# Patient Record
Sex: FEMALE | Race: WHITE | NOT HISPANIC OR LATINO
[De-identification: names, ages, dates, MRNs, and addresses within clinical notes are randomized per-mention and may not be internally consistent; named-entity substitution may affect disease eponyms.]

---

## 2019-03-19 ENCOUNTER — HOSPITAL (OUTPATIENT)
Dept: OTHER | Age: 29
End: 2019-03-19
Attending: NURSE PRACTITIONER

## 2019-04-06 ENCOUNTER — DIAGNOSTIC TRANS (OUTPATIENT)
Dept: OTHER | Age: 29
End: 2019-04-06

## 2019-04-10 ENCOUNTER — HOSPITAL (OUTPATIENT)
Dept: OTHER | Age: 29
End: 2019-04-10
Attending: INTERNAL MEDICINE

## 2019-04-10 LAB
ENDOMYSIAL ANTIBODY IGG: NORMAL
ENDOMYSIAL IGA ANTIBODY: NORMAL
IGA SERPL-MCNC: 74 MG/DL (ref 82–453)
TTG IGA SER IA-ACNC: 2 UNIT
TTG IGG SER IA-ACNC: 2 UNIT

## 2019-04-11 LAB — IGA SERPL-MCNC: 74 MG/DL (ref 82–453)

## 2019-04-12 LAB
ENDOMYSIAL IGA ANTIBODY (ENDOMA): NORMAL
TTG IGA SER IA-ACNC: 2 UNITS
TTG IGG SER IA-ACNC: 2 UNITS

## 2019-04-16 LAB — ENDOMYSIUM IGG TITR SER IF: NORMAL {TITER}

## 2023-08-20 PROBLEM — D72.828 NEUTROPHILIC LEUKOCYTOSIS: Status: ACTIVE | Noted: 2023-08-20

## 2023-08-20 PROBLEM — R43.8 OTHER DISTURBANCES OF SMELL AND TASTE: Status: ACTIVE | Noted: 2023-08-20

## 2023-08-20 PROBLEM — E55.9 VITAMIN D DEFICIENCY: Status: ACTIVE | Noted: 2023-08-20

## 2023-08-20 PROBLEM — E27.9: Status: ACTIVE | Noted: 2023-08-20

## 2023-08-20 PROBLEM — Z97.5 NEXPLANON IN PLACE: Status: ACTIVE | Noted: 2023-08-20

## 2023-08-20 PROBLEM — J45.20 MILD INTERMITTENT ASTHMA (HHS-HCC): Status: ACTIVE | Noted: 2023-08-20

## 2023-08-20 PROBLEM — N32.81 OAB (OVERACTIVE BLADDER): Status: ACTIVE | Noted: 2023-08-20

## 2023-08-20 PROBLEM — T83.32XA MALPOSITIONED IUD: Status: ACTIVE | Noted: 2023-08-20

## 2023-08-20 PROBLEM — D72.9 NEUTROPHILIC LEUKOCYTOSIS: Status: ACTIVE | Noted: 2023-08-20

## 2023-08-20 PROBLEM — F32.81 PREMENSTRUAL DYSPHORIC DISORDER: Status: ACTIVE | Noted: 2023-08-20

## 2023-08-20 PROBLEM — F51.01 PRIMARY INSOMNIA: Status: ACTIVE | Noted: 2023-08-20

## 2023-08-20 PROBLEM — R19.03 RIGHT LOWER QUADRANT ABDOMINAL MASS: Status: ACTIVE | Noted: 2023-08-20

## 2023-08-20 PROBLEM — R20.2 PARESTHESIA OF FOOT: Status: ACTIVE | Noted: 2023-08-20

## 2023-08-20 PROBLEM — N91.1 SECONDARY AMENORRHEA: Status: ACTIVE | Noted: 2023-08-20

## 2023-08-20 PROBLEM — G89.29 CHRONIC RIGHT-SIDED THORACIC BACK PAIN: Status: ACTIVE | Noted: 2023-08-20

## 2023-08-20 PROBLEM — E25.9: Status: ACTIVE | Noted: 2023-08-20

## 2023-08-20 PROBLEM — R00.2 HEART PALPITATIONS: Status: ACTIVE | Noted: 2023-08-20

## 2023-08-20 PROBLEM — E03.9 ACQUIRED HYPOTHYROIDISM: Status: ACTIVE | Noted: 2023-08-20

## 2023-08-20 PROBLEM — Z96.82 PRESENCE OF NEUROSTIMULATOR: Status: ACTIVE | Noted: 2023-08-20

## 2023-08-20 PROBLEM — M54.6 CHRONIC RIGHT-SIDED THORACIC BACK PAIN: Status: ACTIVE | Noted: 2023-08-20

## 2023-08-20 PROBLEM — G47.30 SLEEP APNEA: Status: ACTIVE | Noted: 2023-08-20

## 2023-08-20 PROBLEM — R32 URINARY INCONTINENCE: Status: ACTIVE | Noted: 2023-08-20

## 2023-08-20 PROBLEM — G47.419 TYPE 2 NARCOLEPSY (HHS-HCC): Status: ACTIVE | Noted: 2023-08-20

## 2023-08-20 PROBLEM — D69.6 THROMBOCYTOPENIA (CMS-HCC): Status: ACTIVE | Noted: 2023-08-20

## 2023-08-20 PROBLEM — D58.2 ELEVATED HEMOGLOBIN (CMS-HCC): Status: ACTIVE | Noted: 2023-08-20

## 2023-08-20 RX ORDER — LEVOTHYROXINE SODIUM 75 UG/1
TABLET ORAL
COMMUNITY
Start: 2021-08-11

## 2023-08-20 RX ORDER — MULTIVITAMIN
TABLET ORAL
COMMUNITY
Start: 2016-07-18

## 2023-08-20 RX ORDER — CHOLECALCIFEROL (VITAMIN D3) 50 MCG
TABLET ORAL
COMMUNITY

## 2023-08-20 RX ORDER — OXYCODONE HYDROCHLORIDE 5 MG/1
TABLET ORAL
COMMUNITY
Start: 2022-08-29 | End: 2024-04-11 | Stop reason: ALTCHOICE

## 2023-08-20 RX ORDER — MIRABEGRON 50 MG/1
TABLET, EXTENDED RELEASE ORAL
COMMUNITY
Start: 2022-09-15 | End: 2023-10-30

## 2023-08-20 RX ORDER — ALBUTEROL SULFATE 90 UG/1
AEROSOL, METERED RESPIRATORY (INHALATION)
COMMUNITY

## 2023-09-11 ENCOUNTER — HOSPITAL ENCOUNTER (OUTPATIENT)
Dept: DATA CONVERSION | Facility: HOSPITAL | Age: 33
Discharge: HOME | End: 2023-09-11
Payer: COMMERCIAL

## 2023-09-11 DIAGNOSIS — Z11.51 ENCOUNTER FOR SCREENING FOR HUMAN PAPILLOMAVIRUS (HPV): ICD-10-CM

## 2023-09-11 DIAGNOSIS — Z01.419 ENCOUNTER FOR GYNECOLOGICAL EXAMINATION (GENERAL) (ROUTINE) WITHOUT ABNORMAL FINDINGS: ICD-10-CM

## 2023-10-11 ENCOUNTER — TREATMENT (OUTPATIENT)
Dept: PHYSICAL THERAPY | Facility: CLINIC | Age: 33
End: 2023-10-11
Payer: COMMERCIAL

## 2023-10-11 DIAGNOSIS — R35.0 URINARY FREQUENCY: Primary | ICD-10-CM

## 2023-10-11 DIAGNOSIS — N39.41 URGE INCONTINENCE OF URINE: ICD-10-CM

## 2023-10-11 DIAGNOSIS — M62.89 PELVIC FLOOR DYSFUNCTION: ICD-10-CM

## 2023-10-11 DIAGNOSIS — K59.00 CONSTIPATION: ICD-10-CM

## 2023-10-11 PROCEDURE — 97140 MANUAL THERAPY 1/> REGIONS: CPT | Mod: GP,59 | Performed by: PHYSICAL THERAPIST

## 2023-10-11 PROCEDURE — 97162 PT EVAL MOD COMPLEX 30 MIN: CPT | Mod: GP | Performed by: PHYSICAL THERAPIST

## 2023-10-11 ASSESSMENT — ENCOUNTER SYMPTOMS
OCCASIONAL FEELINGS OF UNSTEADINESS: 0
DEPRESSION: 0
LOSS OF SENSATION IN FEET: 0

## 2023-10-11 ASSESSMENT — PAIN SCALES - GENERAL: PAINLEVEL_OUTOF10: 0 - NO PAIN

## 2023-10-11 ASSESSMENT — PAIN - FUNCTIONAL ASSESSMENT: PAIN_FUNCTIONAL_ASSESSMENT: 0-10

## 2023-10-11 NOTE — PROGRESS NOTES
Physical Therapy Evaluation and Treatment      Patient Name: Sanjuana Lawson  MRN: [id number]  Today's Date: 10/11/2023  Time Calculation  Start Time: 0910  Stop Time: 1010  Time Calculation (min): 60 min      Assessment:   Sanjuana Lawson presents to PT w/ complaints of urinary frequency, urgency, and incontinence as well as constipation. Gross lumbopelvic strength and ROM assessed today w/ no glaring deficits. Decreased bladder and kidney fascia mobility noted w/ abdominal assessment. Internal assessment revealed decreased release of contraction as well as paradoxical contraction of superficial muscles of pelvic floor. Findings consistent w/ dx of urinary frequency, urge incontinence, constipation, and pelvic floor dysfunction. Pt will benefit from skilled PT to address impairments.    Plan:  Rehab Potential: Good  Plan of Care Agreement: Patient  Plan to see pt 1x a week for 6-8 visits    Treatment could include: therapeutic exercise, self care, manual therapy, neuromuscular reeducation, home exercise program, therapeutic activity, ultrasound, TENS     Current Problem:   1. Urinary frequency        2. Urge incontinence of urine        3. Constipation        4. Pelvic floor dysfunction            Subjective    General:   Bladder issues started in 2014, no known triggering event. Always remembers having bladder issues and has progressively gotten worse. Has done botox, TENS, PFPT, medications for inco/OAB, currently has interstim but doesn't think it helps. Went to PFPT in 2019 1x a week for a few weeks, doesn't feel like it helped at all    Has narcolepsy, doesn't sleep well.    Bladder:  DTF: at least every hour  NTF: 1-2 x  Sometimes pain when urinates when urge is present. Urge comes on quick w/ pain in abdomen and then pain w/ urination  Wears thick pads, goes through 4-5/day    Bowel:  1x every 4-5 days  Has tried miralax but had diarrhea so stopped, thinks tried Linzess    Sexual history:  No pain w/  intercourse    Social:  Sees chiropractor 1x a month  Exercise: lifts weights, tabata, hiit workouts  Lives w/ boyfriend and dog  Works as       Precautions:  Precautions  STEADI Fall Risk Score (The score of 4 or more indicates an increased risk of falling): 0  Interstim device implanted    Pain:  Pain Assessment  Pain Assessment: 0-10  Pain Score: 0 - No pain      Objective   External and internal assessment explained. Verbal consent obtained to proceed.     HIP  Hip abd ROM   L    40*  Hip abd ROM R 45*+  Hip rotation R IR/ER wnl, L ER wnl IR slightly limited  Hamstrings R wnl, L lacking 10*  Hip flexor strength R    5/5  Hip flexor strength L   5/5  Hip  abd strength right   5/5  Hip abd strength left   5/5  Hip add strength right    4+/5  Hip add strength left     4+/5  Glute strength R   5/5  Glute strength L    5/5  External rotation strength R    5/5 in seated  External rotation strength L    5/5 in seated  Internal rotation strength  R    5/5 in seated  Internal rotation strength L     5/5 in seated    KNEE  Flexion right        5/5  Flexion left          5/5  Extension right    5/5  Extension left      5/5  *indicates pain with testing    SLS b/l 10s+  Small squat  technique    Varus/Valgus noted - none    Delayed glute activation  y/n - n    Oblique dominant strategy - y    Abdominal:  Decreased bladder mobility L > R and superior>inferior  Decreased left kidney fascia movement    Pelvic floor contraction:  4+/5 deep pelvic floor strength  Decreased release of contraction  Paradoxical contraction of superficial muscles of pelvic floor - contracted during lengthening, lengthened during contraction  + High tone pelvic floor - no areas of pain and discomfort    Pelvic alignment:  Mild left hypomobility  R on left sacral rotation     Outcome Measures:  Other Measures  Other Outcome Measures: Female NIH-CPSI = 18     Treatments:   Manual:  Pt in L sidelying  Diaphragmatic scooping    Pt in R  sidelying  R on left  sacral rotation corrected with MET    Access Code: [email or web address]  URL: [email or web address]  Date: 10/11/2023  Prepared by: Leda Hagen    Exercises  - Hooklying Transversus Abdominis Palpation  - 1 x daily - 7 x weekly - 2 sets - 10 reps - 3 hold  - Sidelying Hip Adduction  - 1 x daily - 7 x weekly - 2 sets - 10 reps - 3 hold    OP EDUCATION:  Education  Plan of Care Discussed and Agreed Upon: yesPt educated on benefits of miralax in small doses, proper technique for abdominal massage to improve bowel motility, following up on medical management of narcolepsy    Goals:  ST goals (4 visits):   Patient will demonstrate independence and report adherence with home exercise program (HEP) to facilitate independent rehab program upon discharge to maximize functional gains.   Patient will demonstrate good isolation and activation of transverse abdominis to provide better lumbopelvic stability.     LT goals (8 visits):  Regulate voiding schedule to every 2-3 hours to allow for more community, work participation.  NIH CPSI will improve by at least 6 points. Baseline:  18  Patient will be able to isolate pelvic floor and contract/relax on cue to improve lumbopelvic stabilization and continence.  Patient will wake no more than 1x per night to void, allowing for restorative sleep

## 2023-10-16 ENCOUNTER — TREATMENT (OUTPATIENT)
Dept: PHYSICAL THERAPY | Facility: CLINIC | Age: 33
End: 2023-10-16
Payer: COMMERCIAL

## 2023-10-16 DIAGNOSIS — N32.81 OAB (OVERACTIVE BLADDER): Primary | ICD-10-CM

## 2023-10-16 DIAGNOSIS — N39.41 URGE INCONTINENCE OF URINE: ICD-10-CM

## 2023-10-16 DIAGNOSIS — G89.29 CHRONIC RIGHT-SIDED THORACIC BACK PAIN: ICD-10-CM

## 2023-10-16 DIAGNOSIS — M54.6 CHRONIC RIGHT-SIDED THORACIC BACK PAIN: ICD-10-CM

## 2023-10-16 PROCEDURE — 97140 MANUAL THERAPY 1/> REGIONS: CPT | Mod: GP | Performed by: PHYSICAL THERAPIST

## 2023-10-16 NOTE — PROGRESS NOTES
Physical Therapy Treatment    Patient Name: Sanjuana Lawson  MRN: [id number]  Today's Date: 10/16/2023         Assessment:   Patient responded well to interventions.     Plan:   Progress as able.     Current Problem  1. OAB (overactive bladder)        2. Urge incontinence of urine        3. Chronic right-sided thoracic back pain            Subjective    Patient notes feeling a little relief after first session.     Objective   L > R psoas fascial tightness        Treatments:  Manual therapy:  Fascial release of psoas, ureters, bladder  Internal superficial to deep stretching  STP release  Urachus release

## 2023-10-24 ENCOUNTER — TREATMENT (OUTPATIENT)
Dept: PHYSICAL THERAPY | Facility: CLINIC | Age: 33
End: 2023-10-24
Payer: COMMERCIAL

## 2023-10-24 DIAGNOSIS — N39.41 URGE INCONTINENCE OF URINE: Primary | ICD-10-CM

## 2023-10-24 PROCEDURE — 97112 NEUROMUSCULAR REEDUCATION: CPT | Mod: GP | Performed by: PHYSICAL THERAPIST

## 2023-10-24 PROCEDURE — 97140 MANUAL THERAPY 1/> REGIONS: CPT | Mod: GP | Performed by: PHYSICAL THERAPIST

## 2023-10-24 NOTE — PROGRESS NOTES
"Physical Therapy Treatment    Patient Name: Sanjuana Lawson  MRN: [id number]  Today's Date: 10/24/2023         Assessment:   Patient responded well to interventions.     Plan:   Progress as able.     Current Problem  1. Urge incontinence of urine            Subjective    Patient notes feeling a little relief after first session.    Felt good until Saturday and had 2 major leaks  Saturday - alcohol - definitely a trigger.   Poor sleep  Poor hydration     Objective   L > R psoas fascial tightness        Treatments:  Manual therapy:  Fascial release of psoas, ureters, bladder  Internal superficial to deep stretching  STP release  Urachus release    Neuro re-ed -   Real time ultrasound - +paradoxical contraction  Good contraction of Tra  Gave \"blueberry\" exercise     P  "

## 2023-10-30 ENCOUNTER — OFFICE VISIT (OUTPATIENT)
Dept: UROLOGY | Facility: CLINIC | Age: 33
End: 2023-10-30
Payer: COMMERCIAL

## 2023-10-30 VITALS
WEIGHT: 162 LBS | SYSTOLIC BLOOD PRESSURE: 116 MMHG | HEART RATE: 65 BPM | DIASTOLIC BLOOD PRESSURE: 74 MMHG | HEIGHT: 69 IN | BODY MASS INDEX: 23.99 KG/M2

## 2023-10-30 DIAGNOSIS — N32.89 BLADDER SPASM: ICD-10-CM

## 2023-10-30 DIAGNOSIS — K59.00 CONSTIPATION, UNSPECIFIED CONSTIPATION TYPE: Primary | ICD-10-CM

## 2023-10-30 DIAGNOSIS — N39.41 URGE INCONTINENCE OF URINE: ICD-10-CM

## 2023-10-30 DIAGNOSIS — R39.15 URGENCY OF MICTURITION: ICD-10-CM

## 2023-10-30 PROCEDURE — 99213 OFFICE O/P EST LOW 20 MIN: CPT | Performed by: NURSE PRACTITIONER

## 2023-10-30 PROCEDURE — 1036F TOBACCO NON-USER: CPT | Performed by: NURSE PRACTITIONER

## 2023-10-30 NOTE — PROGRESS NOTES
"10/30/23   [id number]    Chief Complaint   Patient presents with    Follow-up      Subjective      HPI Sanjuana Lawson is a 32 y.o. female who presents for follow up OAB.  Doesn't feel the InterStim is doing any thing, she has tried multiple programs.  She is better on the days she sees Leda for PFPT; Also dehydrated; did not see GI, MiraLAX was too much, plans to try smaller dose; works w Leda relaxing pelvic floor; Has tried botox bladder and SNM.  The urges in past few mos come on so strong, leaking when she stands up; plans to order Uqora for sleep and bladder; no UTIs; failed gabapentin in past as well; not interested in amitriptyline at this time; saw Dr. Woods in past, maybe could consider Botox pelvic floor?  Failed Gemtesa, Myrbetriq, Oxybutynin, Solfenacin;           Objective     /74   Pulse 65   Ht 1.753 m (5' 9\")   Wt 73.5 kg (162 lb)   BMI 23.92 kg/m²    Physical Exam  General: Appears comfortable and in no apparent distress, well nourished  Head: Normocephalic, atraumatic  Neck: trachea midline  Respiratory: respirations unlabored, no wheezes, and no use of accessory muscles  Cardiovascular: at rest no dyspnea, well perfused  Skin: no visible rashes or lesions  Neurologic: grossly intact, oriented to person, place, and time  Psychiatric: mood and affect appropriate  Musculoskeletal: in chair for appt. no difficulty w upper body movement      Assessment/Plan   Problem List Items Addressed This Visit          Genitourinary and Reproductive    Urinary incontinence     Other Visit Diagnoses       Constipation, unspecified constipation type    -  Primary    Relevant Orders    Referral to Gastroenterology    Urgency of micturition        Bladder spasm              Orders Placed This Encounter   Procedures    Referral to Gastroenterology     Standing Status:   Future     Standing Expiration Date:   4/30/2024     Referral Priority:   Routine     Referral Type:   Consultation     Referral Reason:   " Specialty Services Required     Requested Specialty:   Gastroenterology     Number of Visits Requested:   1         Plan:   Discussed speaking w Urologist further recommendations, maybe botox pelvic floor  Has failed multiple therapy Myrbetriq, Oxybutynin, Solifenacin, SNM, PTNS, botox bladder  Does better days she sees PFPT  Referral GI constipation-cycle w dehydrating for bladder, but also may contributes  Discussed gabapentin failed in past, discussed down regulating w amitriptyline or prozac, reluctant, may consider  Nurse line [phone]    Pretty Perez, APRN-CNP  Lab Results   Component Value Date    GLUCOSE 98 08/09/2022    CALCIUM 9.2 08/09/2022     08/09/2022    K 4.3 08/09/2022    CO2 23 (L) 08/09/2022     08/09/2022    BUN 15 08/09/2022    CREATININE 1.0 08/09/2022

## 2023-10-30 NOTE — PATIENT INSTRUCTIONS
Plan:   Discussed speaking w Urologist further recommendations, maybe botox pelvic floor  Has failed multiple therapy Myrbetriq, Oxybutynin, Solifenacin, SNM, PTNS, botox bladder  Does better days she sees PFPT  Referral GI constipation-cycle w dehydrating for bladder, but also may contributes  Discussed gabapentin failed in past, discussed down regulating w amitriptyline or prozac, reluctant, may consider  Nurse line [phone]

## 2023-11-09 ENCOUNTER — TREATMENT (OUTPATIENT)
Dept: PHYSICAL THERAPY | Facility: CLINIC | Age: 33
End: 2023-11-09
Payer: COMMERCIAL

## 2023-11-09 DIAGNOSIS — R35.0 URINARY FREQUENCY: ICD-10-CM

## 2023-11-09 DIAGNOSIS — M62.89 PELVIC FLOOR DYSFUNCTION: ICD-10-CM

## 2023-11-09 DIAGNOSIS — N39.41 URGE INCONTINENCE OF URINE: ICD-10-CM

## 2023-11-09 DIAGNOSIS — K59.00 CONSTIPATION: ICD-10-CM

## 2023-11-09 PROCEDURE — 97140 MANUAL THERAPY 1/> REGIONS: CPT | Mod: GP | Performed by: PHYSICAL THERAPIST

## 2023-11-09 NOTE — PROGRESS NOTES
Physical Therapy Treatment    Patient Name: Sanjuana Lawson  MRN: [id number]  Today's Date: 11/9/2023         Assessment:   No adverse symptoms     Plan:   Progress as able.     Current Problem  1. Urinary frequency  Follow Up In Physical Therapy      2. Urge incontinence of urine  Follow Up In Physical Therapy      3. Constipation  Follow Up In Physical Therapy      4. Pelvic floor dysfunction  Follow Up In Physical Therapy          Subjective   Patient saw Pretty Perez who recommended consult with Dr. Woods for pelvic floor trigger point injections.      Objective   +TrP left bulbocavernosus  Treatments:  Patient was educated on risks and benefits associated with dry needling, what to expect, and post-procedure protocol (i.e. increased water intake, increased stretching, etc.).  Patient educated on signs and symptoms associated with pneumothorax and to go to ED if patient were to start experiencing these after being needled in thorax area.  Patient was also educated on modifications to HEP.  Verbal consent was received to proceed with treatment.  Patient supine  Superficial dry needling to  .22x25 along medial urachal line  .22x25 left and right bulbocavernosus  .3x50 mm to left levator ani  Stretching by PT after TPDN

## 2023-11-20 ENCOUNTER — TREATMENT (OUTPATIENT)
Dept: PHYSICAL THERAPY | Facility: CLINIC | Age: 33
End: 2023-11-20
Payer: COMMERCIAL

## 2023-11-20 DIAGNOSIS — N39.41 URGE INCONTINENCE OF URINE: ICD-10-CM

## 2023-11-20 DIAGNOSIS — R35.0 URINARY FREQUENCY: ICD-10-CM

## 2023-11-20 DIAGNOSIS — M62.89 PELVIC FLOOR DYSFUNCTION: ICD-10-CM

## 2023-11-20 DIAGNOSIS — K59.00 CONSTIPATION: ICD-10-CM

## 2023-11-20 PROCEDURE — 97140 MANUAL THERAPY 1/> REGIONS: CPT | Mod: GP | Performed by: PHYSICAL THERAPIST

## 2023-11-20 NOTE — PROGRESS NOTES
Physical Therapy Treatment    Patient Name: Sanjuana Lawson  MRN: [id number]  Today's Date: 11/20/2023         Assessment:   No adverse symptoms     Plan:   Progress as able.  Encouraged patient to make appointment with Dr. Woods    Current Problem  1. Urinary frequency  Follow Up In Physical Therapy      2. Urge incontinence of urine  Follow Up In Physical Therapy      3. Constipation  Follow Up In Physical Therapy      4. Pelvic floor dysfunction  Follow Up In Physical Therapy          Subjective   Felt like yesterday was bad. Leaked a lot while grocery shopping.     Objective   +TrP left bulbocavernosus  Treatments:  Patient was educated on risks and benefits associated with dry needling, what to expect, and post-procedure protocol (i.e. increased water intake, increased stretching, etc.).  Patient educated on signs and symptoms associated with pneumothorax and to go to ED if patient were to start experiencing these after being needled in thorax area.  Patient was also educated on modifications to HEP.  Verbal consent was received to proceed with treatment.  Patient supine  Superficial dry needling to  .22x25 along medial urachal line  .22x25 left and right bulbocavernosus  .3x60 mm bilateral adductor (rx2)  Abdominal massage to promote peristalsis  Ileocecal valve mob  Stretching by PT after TPDN

## 2023-11-21 ENCOUNTER — TELEPHONE (OUTPATIENT)
Dept: PRIMARY CARE | Facility: CLINIC | Age: 33
End: 2023-11-21
Payer: COMMERCIAL

## 2023-11-21 NOTE — TELEPHONE ENCOUNTER
Sanjuana called asking if you could fill out her PE form for work. She was seen by KP back in Feb for her PE/pap. Was done on 2/13/23. Form was emailed and printed along with her ofc summary. Was placed in fax box. Will email back once done.

## 2023-11-28 ENCOUNTER — TREATMENT (OUTPATIENT)
Dept: PHYSICAL THERAPY | Facility: CLINIC | Age: 33
End: 2023-11-28
Payer: COMMERCIAL

## 2023-11-28 DIAGNOSIS — R35.0 URINARY FREQUENCY: ICD-10-CM

## 2023-11-28 DIAGNOSIS — M62.89 PELVIC FLOOR DYSFUNCTION: ICD-10-CM

## 2023-11-28 DIAGNOSIS — N39.41 URGE INCONTINENCE OF URINE: ICD-10-CM

## 2023-11-28 DIAGNOSIS — K59.00 CONSTIPATION: ICD-10-CM

## 2023-11-28 PROCEDURE — 97140 MANUAL THERAPY 1/> REGIONS: CPT | Mod: GP | Performed by: PHYSICAL THERAPIST

## 2023-11-28 NOTE — PROGRESS NOTES
Physical Therapy Treatment    Patient Name: Sanjuana Lawson  MRN: [id number]  Today's Date: 11/28/2023  Time Calculation  Start Time: 1106  Stop Time: 1200  Time Calculation (min): 54 min      Assessment:   No adverse symptoms     Plan:   Progress as able.  Encouraged patient to make appointment with Dr. Woods    Current Problem  1. Urinary frequency  Follow Up In Physical Therapy      2. Urge incontinence of urine  Follow Up In Physical Therapy      3. Constipation  Follow Up In Physical Therapy      4. Pelvic floor dysfunction  Follow Up In Physical Therapy          Subjective   Patient notes she has been feeling better.      Objective   No gait antalgia   Treatments:  Patient was educated on risks and benefits associated with dry needling, what to expect, and post-procedure protocol (i.e. increased water intake, increased stretching, etc.).  Patient educated on signs and symptoms associated with pneumothorax and to go to ED if patient were to start experiencing these after being needled in thorax area.  Patient was also educated on modifications to HEP.  Verbal consent was received to proceed with treatment.  Patient supine  Superficial dry needling to  .22x25 along medial and median urachal line  .22x25 left and right bulbocavernosus  .3x60 mm bilateral adductor (rx2)  Patient in left sidelying - .3x60 mm to right piriformis and right glute med   No TrPs in left glute    Stretching by PT after TPDN

## 2023-12-04 NOTE — TELEPHONE ENCOUNTER
Pt says she was seen by our office on 2/13/23 for PE /pap with Estelle Blackmon. Pt is asking for a work form to be filled out confirming she was seen., in order for her to receive a reimbursement for her health insurance. Please advise call P: [phone].

## 2023-12-04 NOTE — TELEPHONE ENCOUNTER
Nichole called back is upset that the office that she was seen at is not able to state that she was seen here. She is asking to speak with the , Luz. She is not understanding why she has to schedule an appt to have her form filled out.

## 2023-12-05 NOTE — TELEPHONE ENCOUNTER
Patient was aware and had requested for it to be emailed to her. Had emailed the forms to the patient

## 2023-12-12 ENCOUNTER — TREATMENT (OUTPATIENT)
Dept: PHYSICAL THERAPY | Facility: CLINIC | Age: 33
End: 2023-12-12
Payer: COMMERCIAL

## 2023-12-12 DIAGNOSIS — N39.41 URGE INCONTINENCE OF URINE: ICD-10-CM

## 2023-12-12 DIAGNOSIS — R35.0 URINARY FREQUENCY: ICD-10-CM

## 2023-12-12 DIAGNOSIS — K59.00 CONSTIPATION: ICD-10-CM

## 2023-12-12 DIAGNOSIS — M62.89 PELVIC FLOOR DYSFUNCTION: ICD-10-CM

## 2023-12-12 PROCEDURE — 97140 MANUAL THERAPY 1/> REGIONS: CPT | Mod: GP | Performed by: PHYSICAL THERAPIST

## 2023-12-12 NOTE — PROGRESS NOTES
Physical Therapy Treatment    Patient Name: Sanjuana Lawson  MRN: [id number]  Today's Date: 12/12/2023         Assessment:   No adverse symptoms     Plan:   Progress as able.      Current Problem  1. Urinary frequency  Follow Up In Physical Therapy      2. Urge incontinence of urine  Follow Up In Physical Therapy      3. Constipation  Follow Up In Physical Therapy      4. Pelvic floor dysfunction  Follow Up In Physical Therapy          Subjective   Patient was very sick last week (likely flu). Patient worked a lot of overtime after.  Patient notes being able to suppress urges better.      Objective   No gait antalgia   L > R internal pelvic floor tightness  Treatments:  Patient was educated on risks and benefits associated with dry needling, what to expect, and post-procedure protocol (i.e. increased water intake, increased stretching, etc.).  Patient educated on signs and symptoms associated with pneumothorax and to go to ED if patient were to start experiencing these after being needled in thorax area.  Patient was also educated on modifications to HEP.  Verbal consent was received to proceed with treatment.  Patient supine      .3x60 mm bilateral adductor (rx2)     Internal stretching by PT     Stretching by PT after TPDN

## 2023-12-18 ENCOUNTER — TREATMENT (OUTPATIENT)
Dept: PHYSICAL THERAPY | Facility: CLINIC | Age: 33
End: 2023-12-18
Payer: COMMERCIAL

## 2023-12-18 DIAGNOSIS — M62.89 PELVIC FLOOR DYSFUNCTION: ICD-10-CM

## 2023-12-18 DIAGNOSIS — K59.00 CONSTIPATION: ICD-10-CM

## 2023-12-18 DIAGNOSIS — R35.0 URINARY FREQUENCY: ICD-10-CM

## 2023-12-18 DIAGNOSIS — N39.41 URGE INCONTINENCE OF URINE: ICD-10-CM

## 2023-12-18 PROCEDURE — 97140 MANUAL THERAPY 1/> REGIONS: CPT | Mod: GP | Performed by: PHYSICAL THERAPIST

## 2023-12-18 NOTE — PROGRESS NOTES
Physical Therapy Treatment    Patient Name: Sanjuana Lawson  MRN: [id number]  Today's Date: 12/18/2023  Time Calculation  Start Time: 1506  Stop Time: 1600  Time Calculation (min): 54 min      Assessment:   No adverse symptoms     Plan:   Progress as able.      Current Problem  1. Urinary frequency  Follow Up In Physical Therapy      2. Urge incontinence of urine  Follow Up In Physical Therapy      3. Constipation  Follow Up In Physical Therapy      4. Pelvic floor dysfunction  Follow Up In Physical Therapy          Subjective   A couple of bad episodes today.     Objective   No gait antalgia   L > R internal pelvic floor tightness  Treatments:  Patient was educated on risks and benefits associated with dry needling, what to expect, and post-procedure protocol (i.e. increased water intake, increased stretching, etc.).  Patient educated on signs and symptoms associated with pneumothorax and to go to ED if patient were to start experiencing these after being needled in thorax area.  Patient was also educated on modifications to HEP.  Verbal consent was received to proceed with treatment.  Patient supine      .3x60 mm bilateral adductor (rx2)     Internal stretching by PT     Stretching by PT after TPDN

## 2023-12-19 ENCOUNTER — APPOINTMENT (OUTPATIENT)
Dept: PHYSICAL THERAPY | Facility: CLINIC | Age: 33
End: 2023-12-19
Payer: COMMERCIAL

## 2024-01-22 ENCOUNTER — APPOINTMENT (OUTPATIENT)
Dept: UROLOGY | Facility: CLINIC | Age: 34
End: 2024-01-22
Payer: COMMERCIAL

## 2024-01-23 ENCOUNTER — OFFICE VISIT (OUTPATIENT)
Dept: UROLOGY | Facility: CLINIC | Age: 34
End: 2024-01-23
Payer: COMMERCIAL

## 2024-01-23 ENCOUNTER — TELEPHONE (OUTPATIENT)
Dept: UROLOGY | Facility: CLINIC | Age: 34
End: 2024-01-23

## 2024-01-23 VITALS — DIASTOLIC BLOOD PRESSURE: 79 MMHG | SYSTOLIC BLOOD PRESSURE: 124 MMHG | HEART RATE: 52 BPM

## 2024-01-23 DIAGNOSIS — N32.81 OAB (OVERACTIVE BLADDER): Primary | ICD-10-CM

## 2024-01-23 DIAGNOSIS — R35.0 URINARY FREQUENCY: ICD-10-CM

## 2024-01-23 DIAGNOSIS — Z86.39 HX OF ADRENAL INSUFFICIENCY: ICD-10-CM

## 2024-01-23 DIAGNOSIS — M62.89 PELVIC FLOOR DYSFUNCTION: ICD-10-CM

## 2024-01-23 LAB
POC BILIRUBIN, URINE: NEGATIVE
POC BLOOD, URINE: NEGATIVE
POC GLUCOSE, URINE: NEGATIVE MG/DL
POC KETONES, URINE: NEGATIVE MG/DL
POC LEUKOCYTES, URINE: NEGATIVE
POC NITRITE,URINE: NEGATIVE
POC PH, URINE: 6 PH
POC PROTEIN, URINE: NEGATIVE MG/DL
POC SPECIFIC GRAVITY, URINE: 1.02
POC UROBILINOGEN, URINE: 0.2 EU/DL

## 2024-01-23 PROCEDURE — 51798 US URINE CAPACITY MEASURE: CPT | Performed by: STUDENT IN AN ORGANIZED HEALTH CARE EDUCATION/TRAINING PROGRAM

## 2024-01-23 PROCEDURE — 81003 URINALYSIS AUTO W/O SCOPE: CPT | Performed by: STUDENT IN AN ORGANIZED HEALTH CARE EDUCATION/TRAINING PROGRAM

## 2024-01-23 PROCEDURE — 99205 OFFICE O/P NEW HI 60 MIN: CPT | Performed by: STUDENT IN AN ORGANIZED HEALTH CARE EDUCATION/TRAINING PROGRAM

## 2024-01-23 PROCEDURE — 1036F TOBACCO NON-USER: CPT | Performed by: STUDENT IN AN ORGANIZED HEALTH CARE EDUCATION/TRAINING PROGRAM

## 2024-01-23 RX ORDER — TAMSULOSIN HYDROCHLORIDE 0.4 MG/1
0.4 CAPSULE ORAL DAILY
Qty: 90 CAPSULE | Refills: 3 | Status: SHIPPED | OUTPATIENT
Start: 2024-01-23 | End: 2025-01-17

## 2024-01-23 RX ORDER — DIAZEPAM 5 MG/1
TABLET ORAL
Qty: 10 TABLET | Refills: 0 | Status: SHIPPED | OUTPATIENT
Start: 2024-01-23

## 2024-01-23 RX ORDER — TAMSULOSIN HYDROCHLORIDE 0.4 MG/1
0.4 CAPSULE ORAL DAILY
Qty: 30 CAPSULE | Refills: 0 | Status: SHIPPED | OUTPATIENT
Start: 2024-01-23 | End: 2024-03-01

## 2024-01-23 NOTE — TELEPHONE ENCOUNTER
Pt stated medication was sent to the wrong pharmacy. [facility] . Thank you

## 2024-01-23 NOTE — LETTER
January 23, 2024     SUE Wood  [address] [place] [address]    Patient: Sanjuana Lawson   YOB: 1990   Date of Visit: 1/23/2024       Dear SUE Cabrera:    Thank you for referring Sanjuana Lawson to me for evaluation. Below are my notes for this consultation.  If you have questions, please do not hesitate to call me. I look forward to following your patient along with you.       Sincerely,     Hernandez Woods MD      CC: SUE Durham  ______________________________________________________________________________________        PCP  SUE Wood         CHIEF COMPLAINT:           HISTORY OF PRESENT ILLNESS:  This is a  33 y.o. y.o. who presents with severe urinary urgency incontinence.  Patient has a longstanding history of this.  She  has tried all 7 medications, she had a Botox injection 100 units, she is also undergoing InterStim with 2 revisions.  The InterStim worked for about 2 months and then she  she reverted back to her old symptoms.  She does complain of some stress urinary incontinence with exercise.  Does not have significant pain related to voiding.  She has no pain with intercourse.  She does not complain of a vaginal bulge or pressure.  She is interested in having children in the future.  She has also tried Flomax.  She is seeing Leda Hagen.  She does report that when she sees her symptoms improve a little bit.  She has a history of congenital adrenal hyperplasia and potentially adrenal insufficiency.  She is not currently on medication for this.         Past Medical History  She has a past medical history of Other conditions influencing health status, Personal history of other drug therapy, Personal history of other drug therapy (09/20/2018), and Personal history of other infectious and parasitic diseases.    Surgical History  She has no past surgical history on file.     Social History  She reports that she has never smoked. She has never  "used smokeless tobacco. She reports current alcohol use of about 1.0 standard drink of alcohol per week. She reports that she does not use drugs.    Family History  Family History   Problem Relation Name Age of Onset   • No Known Problems Mother     • No Known Problems Father     • Lung cancer Father's Brother          unspecified laterally, unspecified part of lung   • Breast cancer Maternal Grandmother          unspecified site of breast, unspecified laterally   • Other (cva) Maternal Grandfather          due to embolism of precebral artery   • Breast cancer Paternal Grandfather          unspecified laterally, unspecified site of breast   • Cancer Other multiple family members    • Hypertension Other multiple family members         Allergies  Patient has no known allergies.        A comprehensive 10+ review of systems was negative except for: see hpi                    PHYSICAL EXAMINATION:  BP Readings from Last 3 Encounters:   01/23/24 124/79   10/30/23 116/74   03/21/23 122/56      Wt Readings from Last 3 Encounters:   10/30/23 73.5 kg (162 lb)   03/21/23 74.9 kg (165 lb 2 oz)   02/28/23 74.4 kg (164 lb 0.4 oz)      BMI: Estimated body mass index is 23.92 kg/m² as calculated from the following:    Height as of 10/30/23: 1.753 m (5' 9\").    Weight as of 10/30/23: 73.5 kg (162 lb).  BSA: Estimated body surface area is 1.89 meters squared as calculated from the following:    Height as of 10/30/23: 1.753 m (5' 9\").    Weight as of 10/30/23: 73.5 kg (162 lb).  HEENT: Normocephalic, atraumatic, PER EOMI, nonicteric, trachea normal, thyroid normal, oropharynx normal.  CARDIAC: regular rate & rhythm, S1 & S2 normal.  No heaves, thrills, gallops or murmurs.  LUNGS: Clear to auscultation, no spinal or CV tenderness.  EXTREMITIES: No evidence of cyanosis, clubbing or edema.    Pelvic:  Genitourinary:  normal external genitalia, Bartholin's glands negative, [place]'s glands negative  Urethra   normal meatus, non-tender, no " periurethral mass  Vaginal mucosa  normal  Cervix  normal  Uterus normal size, nontender  Adnexae  negative nontender, no masses  Atrophy negative      CST negative  Pelvic floor muscle contraction  4/5    POP-Q (in supine position):        Aa -3     Ba -3     C -8              gh 3     pb 3     tvl 8              Ap -3     Bp -3     D -8    Rectal: no hemorrhoids, fissures or masses    PVR (by Ultrasound):  257  Urine dip: No results found for this or any previous visit.         IMPRESSION AND PLAN:  Sanjuana Lawson is a 33 y.o. who presents with treatment refractory urinary urge incontinence    UUI:  Has tried virtually every therapy including neuromodulation with sacral implant and PTNS, currently has the InterStim in place, Botox, all 7 medications,    She does complain of some stress incontinence and this may indicate that she may have either pelvic floor issue or mixed incontinence    I need to track down her urodynamic testing    She does notice improvement with Leda when she has pelvic floor therapy    We are going to try vaginal Valium to see if this improves her symptoms if it does we can consider pelvic floor Botox, I will tentatively schedule her for this in June    We are also going to try Flomax at the volume does not help    If I can track down the urodynamics and going to repeat them    We discussed potentially performing a bladder sling versus  urethral bulking    She will follow-up with me for cystoscopy    All questions and concerns were answered and addressed.  The patient expressed understanding and agrees with the plan.     1/23/2024                                   Cystoscopy   Endocrine referall  Flomax  Botox pelvis in June   Then consider sling vs bulking  Wants to have kids

## 2024-01-23 NOTE — PROGRESS NOTES
PCP  Estelle Blackmon, APRN-CNP         CHIEF COMPLAINT:           HISTORY OF PRESENT ILLNESS:  This is a  33 y.o. y.o. who presents with severe urinary urgency incontinence.  Patient has a longstanding history of this.  She  has tried all 7 medications, she had a Botox injection 100 units, she is also undergoing InterStim with 2 revisions.  The InterStim worked for about 2 months and then she  she reverted back to her old symptoms.  She does complain of some stress urinary incontinence with exercise.  Does not have significant pain related to voiding.  She has no pain with intercourse.  She does not complain of a vaginal bulge or pressure.  She is interested in having children in the future.  She has also tried Flomax.  She is seeing Leda Hagen.  She does report that when she sees her symptoms improve a little bit.  She has a history of congenital adrenal hyperplasia and potentially adrenal insufficiency.  She is not currently on medication for this.         Past Medical History  She has a past medical history of Other conditions influencing health status, Personal history of other drug therapy, Personal history of other drug therapy (09/20/2018), and Personal history of other infectious and parasitic diseases.    Surgical History  She has no past surgical history on file.     Social History  She reports that she has never smoked. She has never used smokeless tobacco. She reports current alcohol use of about 1.0 standard drink of alcohol per week. She reports that she does not use drugs.    Family History  Family History   Problem Relation Name Age of Onset    No Known Problems Mother      No Known Problems Father      Lung cancer Father's Brother          unspecified laterally, unspecified part of lung    Breast cancer Maternal Grandmother          unspecified site of breast, unspecified laterally    Other (cva) Maternal Grandfather          due to embolism of precebral artery    Breast cancer Paternal  "Grandfather          unspecified laterally, unspecified site of breast    Cancer Other multiple family members     Hypertension Other multiple family members         Allergies  Patient has no known allergies.        A comprehensive 10+ review of systems was negative except for: see hpi                    PHYSICAL EXAMINATION:  BP Readings from Last 3 Encounters:   01/23/24 124/79   10/30/23 116/74   03/21/23 122/56      Wt Readings from Last 3 Encounters:   10/30/23 73.5 kg (162 lb)   03/21/23 74.9 kg (165 lb 2 oz)   02/28/23 74.4 kg (164 lb 0.4 oz)      BMI: Estimated body mass index is 23.92 kg/m² as calculated from the following:    Height as of 10/30/23: 1.753 m (5' 9\").    Weight as of 10/30/23: 73.5 kg (162 lb).  BSA: Estimated body surface area is 1.89 meters squared as calculated from the following:    Height as of 10/30/23: 1.753 m (5' 9\").    Weight as of 10/30/23: 73.5 kg (162 lb).  HEENT: Normocephalic, atraumatic, PER EOMI, nonicteric, trachea normal, thyroid normal, oropharynx normal.  CARDIAC: regular rate & rhythm, S1 & S2 normal.  No heaves, thrills, gallops or murmurs.  LUNGS: Clear to auscultation, no spinal or CV tenderness.  EXTREMITIES: No evidence of cyanosis, clubbing or edema.    Pelvic:  Genitourinary:  normal external genitalia, Bartholin's glands negative, [employer]'s glands negative  Urethra   normal meatus, non-tender, no periurethral mass  Vaginal mucosa  normal  Cervix  normal  Uterus normal size, nontender  Adnexae  negative nontender, no masses  Atrophy negative      CST negative  Pelvic floor muscle contraction  4/5    POP-Q (in supine position):        Aa -3     Ba -3     C -8              gh 3     pb 3     tvl 8              Ap -3     Bp -3     D -8    Rectal: no hemorrhoids, fissures or masses    PVR (by Ultrasound):  257  Urine dip: No results found for this or any previous visit.         IMPRESSION AND PLAN:  Snajuana Lawson is a 33 y.o. who presents with treatment refractory urinary " urge incontinence    UUI:  Has tried virtually every therapy including neuromodulation with sacral implant and PTNS, currently has the InterStim in place, Botox, all 7 medications,    She does complain of some stress incontinence and this may indicate that she may have either pelvic floor issue or mixed incontinence    I need to track down her urodynamic testing    She does notice improvement with Leda when she has pelvic floor therapy    We are going to try vaginal Valium to see if this improves her symptoms if it does we can consider pelvic floor Botox, I will tentatively schedule her for this in June    We are also going to try Flomax at the volume does not help    If I can track down the urodynamics and going to repeat them    We discussed potentially performing a bladder sling versus  urethral bulking    She will follow-up with me for cystoscopy    All questions and concerns were answered and addressed.  The patient expressed understanding and agrees with the plan.     Adrenal Insufficiency:   Refer to endocrinology     1/23/2024

## 2024-01-24 RX ORDER — ACETAMINOPHEN 325 MG/1
975 TABLET ORAL ONCE
Status: CANCELLED | OUTPATIENT
Start: 2024-01-24 | End: 2024-01-24

## 2024-01-24 RX ORDER — GABAPENTIN 300 MG/1
600 CAPSULE ORAL ONCE
Status: CANCELLED | OUTPATIENT
Start: 2024-01-24 | End: 2024-01-24

## 2024-01-24 RX ORDER — CELECOXIB 50 MG/1
400 CAPSULE ORAL ONCE
Status: CANCELLED | OUTPATIENT
Start: 2024-01-24 | End: 2024-01-24

## 2024-01-30 ENCOUNTER — TREATMENT (OUTPATIENT)
Dept: PHYSICAL THERAPY | Facility: CLINIC | Age: 34
End: 2024-01-30
Payer: COMMERCIAL

## 2024-01-30 DIAGNOSIS — N39.41 URGE INCONTINENCE OF URINE: ICD-10-CM

## 2024-01-30 DIAGNOSIS — K59.00 CONSTIPATION: ICD-10-CM

## 2024-01-30 DIAGNOSIS — R35.0 URINARY FREQUENCY: ICD-10-CM

## 2024-01-30 DIAGNOSIS — M62.89 PELVIC FLOOR DYSFUNCTION: ICD-10-CM

## 2024-01-30 PROCEDURE — 97140 MANUAL THERAPY 1/> REGIONS: CPT | Mod: GP | Performed by: PHYSICAL THERAPIST

## 2024-01-30 NOTE — PROGRESS NOTES
Physical Therapy Treatment    Patient Name: Sanjuana Lawson  MRN: [id number]  Today's Date: 1/30/2024         Assessment:   No adverse symptoms     Plan:   Progress as able.      Current Problem  1. Urinary frequency  Follow Up In Physical Therapy      2. Urge incontinence of urine  Follow Up In Physical Therapy      3. Constipation  Follow Up In Physical Therapy      4. Pelvic floor dysfunction  Follow Up In Physical Therapy            Subjective   A couple of bad episodes - at home.  Patient saw Dr. Woods    Objective   No gait antalgia   L > R internal pelvic floor tightness  Treatments:  Patient was educated on risks and benefits associated with dry needling, what to expect, and post-procedure protocol (i.e. increased water intake, increased stretching, etc.).  Patient educated on signs and symptoms associated with pneumothorax and to go to ED if patient were to start experiencing these after being needled in thorax area.  Patient was also educated on modifications to HEP.  Verbal consent was received to proceed with treatment.  Patient supine      .3x60 mm bilateral adductor + LTR  .3x30 mm to right bulbo, center of superficial perineum (+ LTR)  .3x60 mm to left levator ani     Internal stretching by PT     Stretching by PT after TPDN

## 2024-02-14 ENCOUNTER — TREATMENT (OUTPATIENT)
Dept: PHYSICAL THERAPY | Facility: CLINIC | Age: 34
End: 2024-02-14
Payer: COMMERCIAL

## 2024-02-14 DIAGNOSIS — N39.41 URGE INCONTINENCE OF URINE: ICD-10-CM

## 2024-02-14 DIAGNOSIS — K59.00 CONSTIPATION: ICD-10-CM

## 2024-02-14 DIAGNOSIS — R35.0 URINARY FREQUENCY: ICD-10-CM

## 2024-02-14 DIAGNOSIS — M62.89 PELVIC FLOOR DYSFUNCTION: ICD-10-CM

## 2024-02-14 PROCEDURE — 97140 MANUAL THERAPY 1/> REGIONS: CPT | Mod: GP | Performed by: PHYSICAL THERAPIST

## 2024-02-14 NOTE — PROGRESS NOTES
"Physical Therapy Treatment    Patient Name: Sanjuana Lawson  MRN: [id number]  Today's Date: 2/14/2024         Assessment:  Decreased tightness.     Plan:   Progress as able.   Patient follows up with Dr. Hernandez Woods tomorrow.     Current Problem  1. Urinary frequency  Follow Up In Physical Therapy      2. Urge incontinence of urine  Follow Up In Physical Therapy      3. Constipation  Follow Up In Physical Therapy      4. Pelvic floor dysfunction  Follow Up In Physical Therapy              Subjective   Really bad few weeks.   Vaginal valium caused constipation   Flomax has been \"awful\" per patient   Getting a cystoscopy tomorrow     Objective   No gait antalgia   L > R internal pelvic floor tightness  Treatments:  Patient was educated on risks and benefits associated with dry needling, what to expect, and post-procedure protocol (i.e. increased water intake, increased stretching, etc.).  Patient educated on signs and symptoms associated with pneumothorax and to go to ED if patient were to start experiencing these after being needled in thorax area.  Patient was also educated on modifications to HEP.  Verbal consent was received to proceed with treatment.  Patient supine      .3x60 mm bilateral adductor + LTR  x2  .3x30 mm to right bulbo, center of superficial perineum (+ LTR)  .3x60 mm to left levator ani   .22x20 x3 over suprapubic line (superficial try needling)   Internal stretching by PT     Stretching by PT after TPDN       "

## 2024-02-15 ENCOUNTER — PROCEDURE VISIT (OUTPATIENT)
Dept: UROLOGY | Facility: CLINIC | Age: 34
End: 2024-02-15
Payer: COMMERCIAL

## 2024-02-15 DIAGNOSIS — N32.81 OAB (OVERACTIVE BLADDER): Primary | ICD-10-CM

## 2024-02-15 DIAGNOSIS — R10.2 PELVIC PAIN: ICD-10-CM

## 2024-02-15 DIAGNOSIS — R35.0 URINARY FREQUENCY: ICD-10-CM

## 2024-02-15 DIAGNOSIS — M62.89 PELVIC FLOOR DYSFUNCTION: ICD-10-CM

## 2024-02-15 LAB
POC APPEARANCE, URINE: CLEAR
POC BILIRUBIN, URINE: NEGATIVE
POC BLOOD, URINE: ABNORMAL
POC COLOR, URINE: YELLOW
POC GLUCOSE, URINE: NEGATIVE MG/DL
POC KETONES, URINE: NEGATIVE MG/DL
POC LEUKOCYTES, URINE: NEGATIVE
POC NITRITE,URINE: NEGATIVE
POC PH, URINE: 7 PH
POC PROTEIN, URINE: NEGATIVE MG/DL
POC SPECIFIC GRAVITY, URINE: 1.02
POC UROBILINOGEN, URINE: 0.2 EU/DL

## 2024-02-15 PROCEDURE — 99214 OFFICE O/P EST MOD 30 MIN: CPT | Performed by: STUDENT IN AN ORGANIZED HEALTH CARE EDUCATION/TRAINING PROGRAM

## 2024-02-15 PROCEDURE — 52000 CYSTOURETHROSCOPY: CPT | Performed by: STUDENT IN AN ORGANIZED HEALTH CARE EDUCATION/TRAINING PROGRAM

## 2024-02-15 NOTE — PROGRESS NOTES
CHIEF COMPLAINT: Cystoscopy         HISTORY OF PRESENT ILLNESS:  Sanjuana Lawson is a 34 y/o female presenting on 2/15/24 for a cystoscopy. Patient is currently taking Valium and Flomax which makes her experience urge incontinence and frequency. She doesn't feel like she's emptying completely. UDS reviewed, demonstrates DO, normal emptying.  She is seeing Leda Hagen for pelvic floor PT and Leda suspects that she may improve with pelvic floor PT         Past Medical History  She has a past medical history of Other conditions influencing health status, Personal history of other drug therapy, Personal history of other drug therapy (09/20/2018), and Personal history of other infectious and parasitic diseases.    Surgical History  She has no past surgical history on file.     Social History  She reports that she has never smoked. She has never used smokeless tobacco. She reports current alcohol use of about 1.0 standard drink of alcohol per week. She reports that she does not use drugs.    Family History  Family History   Problem Relation Name Age of Onset    No Known Problems Mother      No Known Problems Father      Lung cancer Father's Brother          unspecified laterally, unspecified part of lung    Breast cancer Maternal Grandmother          unspecified site of breast, unspecified laterally    Other (cva) Maternal Grandfather          due to embolism of precebral artery    Breast cancer Paternal Grandfather          unspecified laterally, unspecified site of breast    Cancer Other multiple family members     Hypertension Other multiple family members         Allergies  Patient has no known allergies.      A comprehensive 10+ review of systems was negative except for: see hpi                          PHYSICAL EXAMINATION:  BP Readings from Last 3 Encounters:   01/23/24 124/79   10/30/23 116/74   03/21/23 122/56      Wt Readings from Last 3 Encounters:   10/30/23 73.5 kg (162 lb)   03/21/23 74.9 kg (165 lb 2 oz)  "  02/28/23 74.4 kg (164 lb 0.4 oz)      BMI:   Estimated body mass index is 23.92 kg/m² as calculated from the following:    Height as of 10/30/23: 1.753 m (5' 9\").    Weight as of 10/30/23: 73.5 kg (162 lb).  BSA:   Estimated body surface area is 1.89 meters squared as calculated from the following:    Height as of 10/30/23: 1.753 m (5' 9\").    Weight as of 10/30/23: 73.5 kg (162 lb).  HEENT: Normocephalic, atraumatic, PER EOMI, nonicteric, trachea normal, thyroid normal, oropharynx normal.  CARDIAC: regular rate & rhythm, S1 & S2 normal.  No heaves, thrills, gallops or murmurs.  LUNGS: Clear to auscultation, no spinal or CV tenderness.  EXTREMITIES: No evidence of cyanosis, clubbing or edema.    Procedure Note:  Cystoscopy     Indication  LUTS    POST-OP DIAGNOSIS:   Same.  ANESTHESIA:    2% Lidocaine gel.  PROCEDURE:    Cystoscopy.  SURGEON:     esther    FINDINGS    Bladder:  normal  Trigone & Ureteral Orifices: normal.  Vesical Neck:  normal.  Urethra: normal.    PROCEDURE PHYSICIAN NOTE  I was present in the exam room  for the entire procedure as above. We reviewed procedure with patient along with the indications, options, alternatives,  risks of having and not having procedure,  benefits, and probability of success.  We answered the patients questions. We explained the expected post procedure symptoms including possible dysuria and infection.  Pt consented to have procedure.  The patient will follow up for discussion of her results.          Provider Impressions:  Sanjuana Lawson is a 33 y.o. who presents with treatment refractory urinary urge incontinence     UUI:  Has tried virtually every therapy including neuromodulation with sacral implant and PTNS, currently has the InterStim in place, Botox, all 7 medications,    Cystoscopy normal    Recent urodynamics demonstrates detrusor overactivity    She does complain of some stress incontinence and this may indicate that she may have dysfunctional " voiding    Continue pelvic floor therapy with Leda    Currently taking vaginal Valium to see if this improves her symptoms if it does we can consider pelvic floor Botox, I will tentatively schedule her for this in June    Flomax worsened symptoms, discussed bladder sling but rather start with botox we will start with pelvic floor Botox    We discussed potentially performing a bladder sling versus  urethral bulking            Adrenal Insufficiency:   No new complaints  Schedule with endocrinology      Hernandez Woods MD    By signing my name below, I, Ansley Chatterjee   attest that this documentation has been prepared under the direction and in the presence of Dr. Hernandez Woods.

## 2024-02-16 RX ORDER — ACETAMINOPHEN 325 MG/1
975 TABLET ORAL ONCE
OUTPATIENT
Start: 2024-02-16 | End: 2024-02-16

## 2024-02-16 RX ORDER — CELECOXIB 400 MG/1
400 CAPSULE ORAL ONCE
OUTPATIENT
Start: 2024-02-16 | End: 2024-02-16

## 2024-02-16 RX ORDER — GABAPENTIN 600 MG/1
600 TABLET ORAL ONCE
OUTPATIENT
Start: 2024-02-16 | End: 2024-02-16

## 2024-03-01 RX ORDER — TAMSULOSIN HYDROCHLORIDE 0.4 MG/1
0.4 CAPSULE ORAL DAILY
Qty: 90 CAPSULE | Refills: 1 | Status: SHIPPED | OUTPATIENT
Start: 2024-03-01 | End: 2024-08-28

## 2024-03-08 ENCOUNTER — TREATMENT (OUTPATIENT)
Dept: PHYSICAL THERAPY | Facility: CLINIC | Age: 34
End: 2024-03-08
Payer: COMMERCIAL

## 2024-03-08 DIAGNOSIS — M62.89 PELVIC FLOOR DYSFUNCTION: ICD-10-CM

## 2024-03-08 DIAGNOSIS — K59.00 CONSTIPATION: ICD-10-CM

## 2024-03-08 DIAGNOSIS — N39.41 URGE INCONTINENCE OF URINE: ICD-10-CM

## 2024-03-08 DIAGNOSIS — R35.0 URINARY FREQUENCY: ICD-10-CM

## 2024-03-08 PROCEDURE — 97140 MANUAL THERAPY 1/> REGIONS: CPT | Mod: GP | Performed by: PHYSICAL THERAPIST

## 2024-03-08 PROCEDURE — 97112 NEUROMUSCULAR REEDUCATION: CPT | Mod: GP | Performed by: PHYSICAL THERAPIST

## 2024-03-08 NOTE — PROGRESS NOTES
Physical Therapy Treatment    Patient Name: Sanjuana Lawson  MRN: [id number]  Today's Date: 3/8/2024         Assessment:  Decreased tightness.     Plan:  Patient will bring device to turn off interstim for treatment.     Current Problem  1. Urinary frequency  Follow Up In Physical Therapy      2. Urge incontinence of urine  Follow Up In Physical Therapy      3. Constipation  Follow Up In Physical Therapy      4. Pelvic floor dysfunction  Follow Up In Physical Therapy              Subjective   Really bad few weeks.   Cystoscopy negative    Objective   No gait antalgia   L > R internal pelvic floor tightness  Treatments:  Neuromuscular re-education using internal sensor placed in vagina, patient supine, knees supported.  Resting tone =  31.8       microvolts (uv) after 60s  After a few minutes - lowest 24.5  interstim on     Patient was educated on risks and benefits associated with dry needling, what to expect, and post-procedure protocol (i.e. increased water intake, increased stretching, etc.).  Patient educated on signs and symptoms associated with pneumothorax and to go to ED if patient were to start experiencing these after being needled in thorax area.  Patient was also educated on modifications to HEP.  Verbal consent was received to proceed with treatment.  Patient supine      .3x60 mm bilateral adductor + LTR  x2  ni   .22x20 x3 over suprapubic line (superficial try needling)   Internal stretching by PT     Stretching by PT after TPDN

## 2024-03-20 ENCOUNTER — TREATMENT (OUTPATIENT)
Dept: PHYSICAL THERAPY | Facility: CLINIC | Age: 34
End: 2024-03-20
Payer: COMMERCIAL

## 2024-03-20 DIAGNOSIS — R35.0 URINARY FREQUENCY: ICD-10-CM

## 2024-03-20 DIAGNOSIS — K59.00 CONSTIPATION: ICD-10-CM

## 2024-03-20 DIAGNOSIS — N39.41 URGE INCONTINENCE OF URINE: ICD-10-CM

## 2024-03-20 DIAGNOSIS — M62.89 PELVIC FLOOR DYSFUNCTION: ICD-10-CM

## 2024-03-20 PROCEDURE — 97112 NEUROMUSCULAR REEDUCATION: CPT | Mod: GP | Performed by: PHYSICAL THERAPIST

## 2024-03-20 PROCEDURE — 97140 MANUAL THERAPY 1/> REGIONS: CPT | Mod: GP | Performed by: PHYSICAL THERAPIST

## 2024-03-20 NOTE — PROGRESS NOTES
Physical Therapy Treatment    Patient Name: Sanjuana Lawson  MRN: [id number]  Today's Date: 3/20/2024         Assessment:  Significantly improved tone    Plan:  Progress as able    Current Problem  1. Urinary frequency  Follow Up In Physical Therapy      2. Urge incontinence of urine  Follow Up In Physical Therapy      3. Constipation  Follow Up In Physical Therapy      4. Pelvic floor dysfunction  Follow Up In Physical Therapy              Subjective   Not as bad couple of weeks    Objective   No gait antalgia   L > R internal pelvic floor tightness  Treatments:  Neuromuscular re-education using internal sensor placed in vagina, patient supine, knees supported.  Resting tone =  11.4       microvolts (uv) after 60s  12.5hz 5on/5off to inhibit muscle tone x 15 minutes  x20 uv/ma  Tone after = 6.0    Internal trp stretching and release superficial, mid, deep pelvic floor

## 2024-03-22 ENCOUNTER — APPOINTMENT (OUTPATIENT)
Dept: PHYSICAL THERAPY | Facility: CLINIC | Age: 34
End: 2024-03-22
Payer: COMMERCIAL

## 2024-03-27 ENCOUNTER — TREATMENT (OUTPATIENT)
Dept: PHYSICAL THERAPY | Facility: CLINIC | Age: 34
End: 2024-03-27
Payer: COMMERCIAL

## 2024-03-27 DIAGNOSIS — K59.00 CONSTIPATION: ICD-10-CM

## 2024-03-27 DIAGNOSIS — N39.41 URGE INCONTINENCE OF URINE: ICD-10-CM

## 2024-03-27 DIAGNOSIS — R35.0 URINARY FREQUENCY: ICD-10-CM

## 2024-03-27 DIAGNOSIS — M62.89 PELVIC FLOOR DYSFUNCTION: ICD-10-CM

## 2024-03-27 PROCEDURE — 97112 NEUROMUSCULAR REEDUCATION: CPT | Mod: GP | Performed by: PHYSICAL THERAPIST

## 2024-03-27 PROCEDURE — 97140 MANUAL THERAPY 1/> REGIONS: CPT | Mod: GP | Performed by: PHYSICAL THERAPIST

## 2024-03-27 NOTE — PROGRESS NOTES
Physical Therapy Treatment    Patient Name: Sanjuana Lawson  MRN: [id number]  Today's Date: 3/27/2024         Assessment:  Significantly improved tone. Feeling more relaxed in pelvic floor.     Plan:  Progress as able    Current Problem  1. Urinary frequency  Follow Up In Physical Therapy      2. Urge incontinence of urine  Follow Up In Physical Therapy      3. Constipation  Follow Up In Physical Therapy      4. Pelvic floor dysfunction  Follow Up In Physical Therapy              Subjective   Patient notes some good days    Objective   See below  Treatments:  Neuromuscular re-education using internal sensor placed in vagina, patient supine, knees supported.  Resting tone =  9.2       microvolts (uv) after 60s  [id number]      W    16.5            R        7.8        Rise   uv    12.5hz 5on/5off to inhibit muscle tone x 15 minutes  x9 uv/ma  Tone after = 5.0  Peristaltic abdominal massage  Glides to adductors   Internal trp stretching and release superficial, mid, deep pelvic floor

## 2024-04-05 ENCOUNTER — TREATMENT (OUTPATIENT)
Dept: PHYSICAL THERAPY | Facility: CLINIC | Age: 34
End: 2024-04-05
Payer: COMMERCIAL

## 2024-04-05 DIAGNOSIS — N39.41 URGE INCONTINENCE OF URINE: ICD-10-CM

## 2024-04-05 DIAGNOSIS — R35.0 URINARY FREQUENCY: Primary | ICD-10-CM

## 2024-04-05 DIAGNOSIS — M62.89 PELVIC FLOOR DYSFUNCTION: ICD-10-CM

## 2024-04-05 PROCEDURE — 97140 MANUAL THERAPY 1/> REGIONS: CPT | Mod: GP | Performed by: PHYSICAL THERAPIST

## 2024-04-05 NOTE — PROGRESS NOTES
Physical Therapy Treatment    Patient Name: Sanjuana Lawson  MRN: [id number]  Today's Date: 4/5/2024         Assessment:  Decreased pain in right hip flexor. Improved equal gait noted    Plan:  Progress as able    Current Problem  1. Urinary frequency        2. Urge incontinence of urine        3. Pelvic floor dysfunction                  Subjective   Patient notes she injured right hip flexor during leg press at gym. Feels like her symptoms have improved slightly without being on interstim    Patient on menses    Objective   Decreased weightbearing right leg    Treatments:  Patient was educated on risks and benefits associated with dry needling, what to expect, and post-procedure protocol (i.e. increased water intake, increased stretching, etc.).  Patient educated on signs and symptoms associated with pneumothorax and to go to ED if patient were to start experiencing these after being needled in thorax area.  Patient was also educated on modifications to HEP.  Verbal consent was received to proceed with treatment.  Patient supine  .3x75 mm to right iliopsoas conjoint tendong  .3x60 mm to right rectus femoris and right adductor  Stretching by PT  Glides to adductors   Internal trp stretching and release superficial, mid, deep pelvic floor

## 2024-04-11 ENCOUNTER — TREATMENT (OUTPATIENT)
Dept: PHYSICAL THERAPY | Facility: CLINIC | Age: 34
End: 2024-04-11
Payer: COMMERCIAL

## 2024-04-11 ENCOUNTER — OFFICE VISIT (OUTPATIENT)
Dept: GASTROENTEROLOGY | Facility: CLINIC | Age: 34
End: 2024-04-11
Payer: COMMERCIAL

## 2024-04-11 VITALS
BODY MASS INDEX: 24.44 KG/M2 | SYSTOLIC BLOOD PRESSURE: 124 MMHG | WEIGHT: 165 LBS | HEIGHT: 69 IN | HEART RATE: 55 BPM | DIASTOLIC BLOOD PRESSURE: 75 MMHG

## 2024-04-11 DIAGNOSIS — M62.89 PELVIC FLOOR DYSFUNCTION: ICD-10-CM

## 2024-04-11 DIAGNOSIS — K59.04 CHRONIC IDIOPATHIC CONSTIPATION: Primary | ICD-10-CM

## 2024-04-11 DIAGNOSIS — N39.41 URGE INCONTINENCE OF URINE: ICD-10-CM

## 2024-04-11 DIAGNOSIS — R35.0 URINARY FREQUENCY: ICD-10-CM

## 2024-04-11 DIAGNOSIS — K59.00 CONSTIPATION, UNSPECIFIED CONSTIPATION TYPE: ICD-10-CM

## 2024-04-11 PROCEDURE — 99203 OFFICE O/P NEW LOW 30 MIN: CPT | Performed by: INTERNAL MEDICINE

## 2024-04-11 PROCEDURE — 97112 NEUROMUSCULAR REEDUCATION: CPT | Mod: GP | Performed by: PHYSICAL THERAPIST

## 2024-04-11 PROCEDURE — 97140 MANUAL THERAPY 1/> REGIONS: CPT | Mod: GP | Performed by: PHYSICAL THERAPIST

## 2024-04-11 RX ORDER — LUBIPROSTONE 24 UG/1
24 CAPSULE ORAL
Qty: 60 CAPSULE | Refills: 3 | Status: SHIPPED | OUTPATIENT
Start: 2024-04-11 | End: 2025-04-11

## 2024-04-11 ASSESSMENT — ENCOUNTER SYMPTOMS
DIARRHEA: 0
VOMITING: 0
ABDOMINAL DISTENTION: 1
NAUSEA: 0
BLOOD IN STOOL: 0
ANAL BLEEDING: 0
CONSTIPATION: 1
RECTAL PAIN: 0
ABDOMINAL PAIN: 1

## 2024-04-11 NOTE — PATIENT INSTRUCTIONS
Dietary modification as discussed.  Prune juice/Metamucil gummy  Amitiza 24 mcg twice daily.  Follow-up with me in 4 to 6 months

## 2024-04-11 NOTE — PROGRESS NOTES
"Subjective   Patient ID: Sanjuana Lawson is a 33 y.o. female who presents for Constipation.    /75 (BP Location: Left arm, Patient Position: Sitting)   Pulse 55   Ht 1.746 m (5' 8.75\")   Wt 74.8 kg (165 lb)   BMI 24.54 kg/m²     Previous EGD No  Previous Colonoscopy No  Family History of Stomach Cancer No  Family History of Colon Cancer No      Constipation  Associated symptoms include abdominal pain. Pertinent negatives include no diarrhea, nausea, rectal pain or vomiting.     33-year-old female, working as a , history of hypothyroidism, significant overactive bladder came to GI clinic for further evaluation due to chronic constipation.  She endorses constipation for years-bowel movement every 5 to 6 days, [place] type I-II, tried fiber supplement, lactulose, milk of magnesia, MiraLAX without success.  Interestingly bowel habits normalizes  in perimenstrual period.  She cannot drink plenty of water due to significant overactive bladder.  Denies nausea vomiting abdominal pain diarrhea weight loss melena/hematochezia.  Recent outside labs-CBC, CMP unremarkable.  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  Discussed in detail, all question answered.  Review of Systems   Gastrointestinal:  Positive for abdominal distention, abdominal pain and constipation. Negative for anal bleeding, blood in stool, diarrhea, nausea, rectal pain and vomiting.     12 Point ROS negative outside of symptoms stated above in HPI    Past Medical History:   Diagnosis Date    Other conditions influencing health status     Uncomplicated asthma, unspecified asthma severity    Personal history of other drug therapy     History of vaccination against human papillomavirus    Personal history of other drug therapy 09/20/2018    S/P Botox injection    Personal history of other infectious and parasitic diseases     History of chickenpox       No past surgical " history on file.    No relevant family history has been documented for this patient.     reports that she has never smoked. She has never used smokeless tobacco. She reports current alcohol use of about 1.0 standard drink of alcohol per week. She reports that she does not use drugs.    No Known Allergies         Objective         Current Outpatient Medications:     albuterol 90 mcg/actuation inhaler, 2 puffs Inhaler every 4 hours as needed, Disp: , Rfl:     cholecalciferol (Vitamin D-3) 50 MCG (2000 UT) tablet, Vitamin D TABS  Refills: 0     Active, Disp: , Rfl:     docosahexaenoic acid/epa (FISH OIL ORAL), orally once a day, Disp: , Rfl:     glucos sul 2KCl/msm/chond/C/Mn (GLUCOSAMINE CHONDROITIN ORAL), with MSM, Disp: , Rfl:     GLUTATHIONE ORAL, as directed, Disp: , Rfl:     levothyroxine (Synthroid) 75 mcg tablet, take 1 tablet daily in the morning on an empty stomach paola 1 Orally Once a day for 90 days, Disp: , Rfl:     multivit,calc,mins/iron/folic (WOMEN'S ONE DAILY ORAL), as directed Orally, Disp: , Rfl:     vitamin B complex (COMPLEX B-100 ORAL), as directed Orally, Disp: , Rfl:     diazePAM (Valium) 5 mg tablet, Place 1 pill in vagina q8hrs prn for pelvic pain (Patient not taking: Reported on 4/11/2024), Disp: 10 tablet, Rfl: 0    multivitamin tablet, Daily Multiple Vitamins Oral Tablet  Refills: 0      Start : 18-Jul-2016  Active, Disp: , Rfl:     tamsulosin (Flomax) 0.4 mg 24 hr capsule, Take 1 capsule (0.4 mg) by mouth once daily. (Patient not taking: Reported on 4/11/2024), Disp: 90 capsule, Rfl: 3    tamsulosin (Flomax) 0.4 mg 24 hr capsule, TAKE 1 CAPSULE (0.4 MG) BY MOUTH ONCE DAILY FOR 30 DOSES. (Patient not taking: Reported on 4/11/2024), Disp: 90 capsule, Rfl: 1      Physical Exam  HENT:      Mouth/Throat:      Mouth: Mucous membranes are moist.   Eyes:      General: No scleral icterus.  Cardiovascular:      Rate and Rhythm: Regular rhythm. Bradycardia present.      Comments: mild  Pulmonary:       Effort: Pulmonary effort is normal.      Breath sounds: Normal breath sounds.   Abdominal:      General: Abdomen is flat. Bowel sounds are normal.      Palpations: Abdomen is soft.   Musculoskeletal:      Right lower leg: No edema.      Left lower leg: No edema.   Neurological:      Mental Status: She is alert and oriented to person, place, and time.            Assessment/Plan    Chronic idiopathic constipation.  History of methane positive SIBO  Overactive bladder      Dietary modification as discussed.  Prune juice/Metamucil gummy  Amitiza 24 mcg twice daily.  Follow-up with me in 4 to 6 months

## 2024-04-11 NOTE — LETTER
"April 11, 2024     Pretty Perez, APRN-CNP  [address] [facility] [address], [address]  [facility] [address]    Patient: Sanjuana Lawson   YOB: 1990   Date of Visit: 4/11/2024       Dear Dr. Pretty Perez, APRN-CNP:    Thank you for referring Sanjuana Lawson to me for evaluation. Below are my notes for this consultation.  If you have questions, please do not hesitate to call me. I look forward to following your patient along with you.       Sincerely,     Hao Bergman MD      CC: No Recipients  ______________________________________________________________________________________    Subjective  Patient ID: Sanjuana Lawson is a 33 y.o. female who presents for Constipation.    /75 (BP Location: Left arm, Patient Position: Sitting)   Pulse 55   Ht 1.746 m (5' 8.75\")   Wt 74.8 kg (165 lb)   BMI 24.54 kg/m²     Previous EGD No  Previous Colonoscopy No  Family History of Stomach Cancer No  Family History of Colon Cancer No      Constipation  Associated symptoms include abdominal pain. Pertinent negatives include no diarrhea, nausea, rectal pain or vomiting.     33-year-old female, working as a , history of hypothyroidism, significant overactive bladder came to GI clinic for further evaluation due to chronic constipation.  She endorses constipation for years-bowel movement every 5 to 6 days, Travis type I-II, tried fiber supplement, lactulose, milk of magnesia, MiraLAX without success.  Interestingly bowel habits normalizes  in perimenstrual period.  She cannot drink plenty of water due to significant overactive bladder.  Denies nausea vomiting abdominal pain diarrhea weight loss melena/hematochezia.  Recent outside labs-CBC, CMP unremarkable.  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  Discussed in detail, all question answered.  Review of Systems   Gastrointestinal:  Positive for abdominal distention, abdominal pain and " constipation. Negative for anal bleeding, blood in stool, diarrhea, nausea, rectal pain and vomiting.     12 Point ROS negative outside of symptoms stated above in HPI    Past Medical History:   Diagnosis Date   • Other conditions influencing health status     Uncomplicated asthma, unspecified asthma severity   • Personal history of other drug therapy     History of vaccination against human papillomavirus   • Personal history of other drug therapy 09/20/2018    S/P Botox injection   • Personal history of other infectious and parasitic diseases     History of chickenpox       No past surgical history on file.    No relevant family history has been documented for this patient.     reports that she has never smoked. She has never used smokeless tobacco. She reports current alcohol use of about 1.0 standard drink of alcohol per week. She reports that she does not use drugs.    No Known Allergies         Objective        Current Outpatient Medications:   •  albuterol 90 mcg/actuation inhaler, 2 puffs Inhaler every 4 hours as needed, Disp: , Rfl:   •  cholecalciferol (Vitamin D-3) 50 MCG (2000 UT) tablet, Vitamin D TABS  Refills: 0     Active, Disp: , Rfl:   •  docosahexaenoic acid/epa (FISH OIL ORAL), orally once a day, Disp: , Rfl:   •  glucos sul 2KCl/msm/chond/C/Mn (GLUCOSAMINE CHONDROITIN ORAL), with MSM, Disp: , Rfl:   •  GLUTATHIONE ORAL, as directed, Disp: , Rfl:   •  levothyroxine (Synthroid) 75 mcg tablet, take 1 tablet daily in the morning on an empty stomach paola 1 Orally Once a day for 90 days, Disp: , Rfl:   •  multivit,calc,mins/iron/folic (WOMEN'S ONE DAILY ORAL), as directed Orally, Disp: , Rfl:   •  vitamin B complex (COMPLEX B-100 ORAL), as directed Orally, Disp: , Rfl:   •  diazePAM (Valium) 5 mg tablet, Place 1 pill in vagina q8hrs prn for pelvic pain (Patient not taking: Reported on 4/11/2024), Disp: 10 tablet, Rfl: 0  •  multivitamin tablet, Daily Multiple Vitamins Oral Tablet  Refills: 0      Start  : 18-Jul-2016  Active, Disp: , Rfl:   •  tamsulosin (Flomax) 0.4 mg 24 hr capsule, Take 1 capsule (0.4 mg) by mouth once daily. (Patient not taking: Reported on 4/11/2024), Disp: 90 capsule, Rfl: 3  •  tamsulosin (Flomax) 0.4 mg 24 hr capsule, TAKE 1 CAPSULE (0.4 MG) BY MOUTH ONCE DAILY FOR 30 DOSES. (Patient not taking: Reported on 4/11/2024), Disp: 90 capsule, Rfl: 1      Physical Exam  HENT:      Mouth/Throat:      Mouth: Mucous membranes are moist.   Eyes:      General: No scleral icterus.  Cardiovascular:      Rate and Rhythm: Regular rhythm. Bradycardia present.      Comments: mild  Pulmonary:      Effort: Pulmonary effort is normal.      Breath sounds: Normal breath sounds.   Abdominal:      General: Abdomen is flat. Bowel sounds are normal.      Palpations: Abdomen is soft.   Musculoskeletal:      Right lower leg: No edema.      Left lower leg: No edema.   Neurological:      Mental Status: She is alert and oriented to person, place, and time.            Assessment/Plan   Chronic idiopathic constipation.  History of methane positive SIBO  Overactive bladder      Dietary modification as discussed.  Prune juice/Metamucil gummy  Amitiza 24 mcg twice daily.  Follow-up with me in 4 to 6 months

## 2024-04-11 NOTE — PROGRESS NOTES
Physical Therapy Treatment    Patient Name: Sanjuana Lawson  MRN: [id number]  Today's Date: 4/11/2024         Assessment:  Decreased pain in right hip flexor. Improved equal gait noted    Plan:  Progress as able    Current Problem  1. Chronic idiopathic constipation        2. Pelvic floor dysfunction        3. Urge incontinence of urine        4. Urinary frequency              Subjective   Maybe slightly better    Objective   No gait antalgia    Treatments:  Neuromuscular re-education using internal sensor placed in vagina, patient supine, knees supported.  Resting tone =   4.0     microvolts (uv) after 60s  12.5hz 5on/5off to inhibit muscle tone x 15 minutes  x 14 uv/ma  After 4.4        Patient was educated on risks and benefits associated with dry needling, what to expect, and post-procedure protocol (i.e. increased water intake, increased stretching, etc.).  Patient educated on signs and symptoms associated with pneumothorax and to go to ED if patient were to start experiencing these after being needled in thorax area.  Patient was also educated on modifications to HEP.  Verbal consent was received to proceed with treatment.  Patient supine  .3x60 mm to right rectus femoris and right adductor  Stretching by PT  Glides to adductors   Internal trp stretching and release superficial, mid, deep pelvic floor

## 2024-04-15 ENCOUNTER — TREATMENT (OUTPATIENT)
Dept: PHYSICAL THERAPY | Facility: CLINIC | Age: 34
End: 2024-04-15
Payer: COMMERCIAL

## 2024-04-15 DIAGNOSIS — K59.04 CHRONIC IDIOPATHIC CONSTIPATION: ICD-10-CM

## 2024-04-15 DIAGNOSIS — N39.41 URGE INCONTINENCE OF URINE: Primary | ICD-10-CM

## 2024-04-15 DIAGNOSIS — R35.0 URINARY FREQUENCY: ICD-10-CM

## 2024-04-15 DIAGNOSIS — M62.89 PELVIC FLOOR DYSFUNCTION: ICD-10-CM

## 2024-04-15 PROCEDURE — 97112 NEUROMUSCULAR REEDUCATION: CPT | Mod: GP | Performed by: PHYSICAL THERAPIST

## 2024-04-15 PROCEDURE — 97140 MANUAL THERAPY 1/> REGIONS: CPT | Mod: GP | Performed by: PHYSICAL THERAPIST

## 2024-04-15 NOTE — PROGRESS NOTES
Physical Therapy Treatment    Patient Name: Sanjuana Lawson  MRN: [id number]  Today's Date: 4/15/2024         Assessment:  Asked patient to pay attention to position she is in when urge occurs.     Plan:  Progress as able    Current Problem  1. Urge incontinence of urine        2. Urinary frequency        3. Chronic idiopathic constipation        4. Pelvic floor dysfunction                Subjective   Maybe slightly better    Objective   See below  Increased tone at superficial layer    Treatments:  Neuromuscular re-education using internal sensor placed in vagina, patient supine, knees supported.  Resting tone =   7.2   microvolts (uv) after 60s  12.5hz 5on/5off to inhibit muscle tone x 15 minutes  x 16 uv/ma  After 3.2    Glides to adductors   Internal trp stretching and release superficial, mid, deep pelvic floor

## 2024-04-25 ENCOUNTER — APPOINTMENT (OUTPATIENT)
Dept: PHYSICAL THERAPY | Facility: CLINIC | Age: 34
End: 2024-04-25
Payer: COMMERCIAL

## 2024-04-29 ENCOUNTER — OFFICE VISIT (OUTPATIENT)
Dept: ENDOCRINOLOGY | Facility: CLINIC | Age: 34
End: 2024-04-29
Payer: COMMERCIAL

## 2024-04-29 VITALS
DIASTOLIC BLOOD PRESSURE: 62 MMHG | BODY MASS INDEX: 24.44 KG/M2 | TEMPERATURE: 98.6 F | HEIGHT: 69 IN | WEIGHT: 165 LBS | RESPIRATION RATE: 18 BRPM | HEART RATE: 78 BPM | SYSTOLIC BLOOD PRESSURE: 109 MMHG

## 2024-04-29 DIAGNOSIS — R53.83 OTHER FATIGUE: Primary | ICD-10-CM

## 2024-04-29 PROCEDURE — 1036F TOBACCO NON-USER: CPT | Performed by: STUDENT IN AN ORGANIZED HEALTH CARE EDUCATION/TRAINING PROGRAM

## 2024-04-29 PROCEDURE — 99203 OFFICE O/P NEW LOW 30 MIN: CPT | Performed by: STUDENT IN AN ORGANIZED HEALTH CARE EDUCATION/TRAINING PROGRAM

## 2024-04-29 NOTE — PROGRESS NOTES
"Endocrinology  4/29/2024    History of Present Illness   Sanjuana Lawson is a 33 y.o. year old female with medical history of OAB and urinary incontinence s/p multiple interventions, hypothyroidism, here for adrenal fatigue.    Sees a private practice functional medicine provider who diagnosed her with adrenal fatigue.     She is on many different supplements through this practice including Adren-All, Licorice extract, ProOmega Lemon, Liquid Vit D3, active B-complex, Triple Magnesium Complex, Ortho Biotic, Digest Gold, Prenatal Essentials.     Reports that since being on these for many years, blood work has improved.     LV: Dr. Fraser, 2021 for \"adrenal fatigue\".  At that time, patient as on Thyraxis and Adrenal-All supplements.     Diagnosed with hypothyroidism 2018 and started on levothyroxine 75 mcg daily.  Since then TSH has been normal.     Today she would like to know if there is anything I can help her with to feel like she is not \"running on fumes\" all the time. Works nights as a  - will be on nights 30 days and then days 30 days. No more than 3 nights in a row.     She recently was off all supplements for many months. Had blood work done.   Does not feel differently on or off supplements.     Review of Systems   General: Denies fever or chills   Head: Denies headache or vision changes   Neck: Denies tenderness or lumps   Cardiac: Denies chest pain   Respiratory: Denies shortness of breath or cough   GI: Denies abdominal pain, nausea, or vomiting   Extremities: Denies swelling   Skin: Denies rash     Medications     Current Outpatient Medications   Medication Instructions    albuterol 90 mcg/actuation inhaler 2 puffs Inhaler every 4 hours as needed    cholecalciferol (Vitamin D-3) 50 MCG (2000 UT) tablet Vitamin D TABS   Refills: 0       Active    diazePAM (Valium) 5 mg tablet Place 1 pill in vagina q8hrs prn for pelvic pain    docosahexaenoic acid/epa (FISH OIL ORAL) orally once a day    glucos " sul 2KCl/msm/chond/C/Mn (GLUCOSAMINE CHONDROITIN ORAL) with MSM    GLUTATHIONE ORAL as directed    levothyroxine (Synthroid) 75 mcg tablet take 1 tablet daily in the morning on an empty stomach paola 1 Orally Once a day for 90 days    lubiprostone (AMITIZA) 24 mcg, oral, 2 times daily with meals, Take with meals    multivit,calc,mins/iron/folic (WOMEN'S ONE DAILY ORAL) as directed Orally    multivitamin tablet Daily Multiple Vitamins Oral Tablet   Refills: 0        Start : 18-Jul-2016   Active    tamsulosin (FLOMAX) 0.4 mg, oral, Daily    tamsulosin (FLOMAX) 0.4 mg, oral, Daily    vitamin B complex (COMPLEX B-100 ORAL) as directed Orally          History     Past Medical History:   Diagnosis Date    Other conditions influencing health status     Uncomplicated asthma, unspecified asthma severity    Personal history of other drug therapy     History of vaccination against human papillomavirus    Personal history of other drug therapy 09/20/2018    S/P Botox injection    Personal history of other infectious and parasitic diseases     History of chickenpox       No past surgical history on file.    Family History   Problem Relation Name Age of Onset    No Known Problems Mother      No Known Problems Father      Lung cancer Father's Brother          unspecified laterally, unspecified part of lung    Breast cancer Maternal Grandmother          unspecified site of breast, unspecified laterally    Other (cva) Maternal Grandfather          due to embolism of precebral artery    Breast cancer Paternal Grandfather          unspecified laterally, unspecified site of breast    Cancer Other multiple family members     Hypertension Other multiple family members         No Known Allergies     Social history:   - 1 drink weekly   - No tobacco use   - No recreational drug use   - Works as a  - works shift work, and nights 1/2 the time. [place].    - Lives with boyfriend and Dog.       Physical Exam   /62 (BP  "Location: Left arm, Patient Position: Sitting, BP Cuff Size: Adult)   Pulse 78   Temp 37 °C (98.6 °F) (Tympanic)   Resp 18   Ht 1.753 m (5' 9\")   Wt 74.8 kg (165 lb)   BMI 24.37 kg/m²   General: Well appearing, no acute distress  Heart: Normal rate, regular rhythm   Neck: Soft, nontender, no lymphadenopathy, thyroid normal in size   Lungs: Breathing comfortably on room air, CTAB  Extremities: Warm, no edema  Skin: No rashes, multiple tattoos     Labs and Imaging     Component      Latest Ref Rng 5/27/2021 11/1/2021   17-Hydroxyprogesterone 310.44… (H)     ACTH  15.9…    CORTISOL      UG/DL  11.6       4/10/2024:  TSH  Normal  Cortisol 15 - reports this blood work was done while off all supplements for many months.     Assessment and Plan   Sanjuana Lawson is a 33 y.o. year old female with medical history of OAB and urinary incontinence s/p multiple interventions, hypothyroidism, here for adrenal fatigue. Discussed that adrenal fatigue is not a medical diagnosis. Off all supplements, her random cortisol was 15, which is adequate. No evidence of adrenal insufficiency. She has hypothyroidism and is clinically and biochemically euthyroid on LT4 75 mcg daily. Fatigue is likely related to work - shift work, and flips back and forth between days and nights. Discussed that I do not recommend many of the supplements she has been taking.    RTC: TARA Delacruz, DO   Endocrinology                       "

## 2024-04-30 ENCOUNTER — APPOINTMENT (OUTPATIENT)
Dept: PHYSICAL THERAPY | Facility: CLINIC | Age: 34
End: 2024-04-30
Payer: COMMERCIAL

## 2024-05-14 ENCOUNTER — TREATMENT (OUTPATIENT)
Dept: PHYSICAL THERAPY | Facility: CLINIC | Age: 34
End: 2024-05-14
Payer: COMMERCIAL

## 2024-05-14 DIAGNOSIS — M62.89 PELVIC FLOOR DYSFUNCTION: Primary | ICD-10-CM

## 2024-05-14 DIAGNOSIS — R35.0 URINARY FREQUENCY: ICD-10-CM

## 2024-05-14 DIAGNOSIS — N39.41 URGE INCONTINENCE OF URINE: ICD-10-CM

## 2024-05-14 DIAGNOSIS — N32.81 OAB (OVERACTIVE BLADDER): ICD-10-CM

## 2024-05-14 PROCEDURE — 97112 NEUROMUSCULAR REEDUCATION: CPT | Mod: GP | Performed by: PHYSICAL THERAPIST

## 2024-05-14 PROCEDURE — 97140 MANUAL THERAPY 1/> REGIONS: CPT | Mod: GP | Performed by: PHYSICAL THERAPIST

## 2024-05-14 NOTE — PROGRESS NOTES
Physical Therapy Treatment    Patient Name: Sanjuana Lawson  MRN: [id number]  Today's Date: 5/14/2024  Time Calculation  Start Time: 1135  Stop Time: 1228  Time Calculation (min): 53 min      Assessment:  Improved tone noted    Plan:  Progress as able    Current Problem  1. Pelvic floor dysfunction        2. OAB (overactive bladder)        3. Urinary frequency        4. Urge incontinence of urine                  Subjective   Patient notes good days and bad days    Objective   See below  Increased tone at superficial layer    Treatments:  Neuromuscular re-education using internal sensor placed in vagina, patient supine, knees supported.  Resting tone =   4.9  microvolts (uv) after 60s  12.5hz 5on/5off to inhibit muscle tone x 15 minutes  x 16 uv/ma  After 3.2    Glides to adductors/abdominals  Internal trp stretching and release superficial, mid, deep pelvic floor

## 2024-05-31 ENCOUNTER — TREATMENT (OUTPATIENT)
Dept: PHYSICAL THERAPY | Facility: CLINIC | Age: 34
End: 2024-05-31
Payer: COMMERCIAL

## 2024-05-31 DIAGNOSIS — R35.0 URINARY FREQUENCY: Primary | ICD-10-CM

## 2024-05-31 DIAGNOSIS — M62.89 PELVIC FLOOR DYSFUNCTION: ICD-10-CM

## 2024-05-31 DIAGNOSIS — N39.41 URGE INCONTINENCE OF URINE: ICD-10-CM

## 2024-05-31 PROCEDURE — 97140 MANUAL THERAPY 1/> REGIONS: CPT | Mod: GP | Performed by: PHYSICAL THERAPIST

## 2024-05-31 PROCEDURE — 97112 NEUROMUSCULAR REEDUCATION: CPT | Mod: GP | Performed by: PHYSICAL THERAPIST

## 2024-05-31 NOTE — PROGRESS NOTES
Physical Therapy Treatment    Patient Name: Sanjuana Lawson  MRN: [id number]  Today's Date: 5/31/2024  Time Calculation  Start Time: 0805  Stop Time: 0900  Time Calculation (min): 55 min      Assessment:  Improved tone noted    Plan:  Progress as able    Current Problem  1. Urinary frequency        2. Pelvic floor dysfunction        3. Urge incontinence of urine                    Subjective   Patient notes good days and bad days    Objective   See below  Increased tone at superficial layer    Treatments:  Neuromuscular re-education using internal sensor placed in vagina, patient supine, knees supported.  Resting tone =   7.0  microvolts (uv) after 60s  12.5hz 5on/5off to inhibit muscle tone x 15 minutes  x 17 uv/ma  After 4.9    Glides to adductors/abdominals  Internal trp stretching and release superficial, mid, deep pelvic floor

## 2024-06-06 ENCOUNTER — TREATMENT (OUTPATIENT)
Dept: PHYSICAL THERAPY | Facility: CLINIC | Age: 34
End: 2024-06-06
Payer: COMMERCIAL

## 2024-06-06 DIAGNOSIS — R35.0 URINARY FREQUENCY: Primary | ICD-10-CM

## 2024-06-06 DIAGNOSIS — M62.89 PELVIC FLOOR DYSFUNCTION: ICD-10-CM

## 2024-06-06 PROCEDURE — 97140 MANUAL THERAPY 1/> REGIONS: CPT | Mod: GP | Performed by: PHYSICAL THERAPIST

## 2024-06-06 PROCEDURE — 97112 NEUROMUSCULAR REEDUCATION: CPT | Mod: GP | Performed by: PHYSICAL THERAPIST

## 2024-06-06 NOTE — PROGRESS NOTES
Physical Therapy Treatment    Patient Name: Sanjuana Lawson  MRN: [id number]  Today's Date: 6/6/2024  Time Calculation  Start Time: 0906  Stop Time: 1000  Time Calculation (min): 54 min      Assessment:  Improved tone noted    Plan:  Progress as able    Current Problem  1. Urinary frequency        2. Pelvic floor dysfunction                      Subjective   Patient notes good days and bad days    Objective   See below  Increased tone at superficial layer    Treatments:  Neuromuscular re-education using internal sensor placed in vagina, patient supine, knees supported.  Resting tone =   5.9  microvolts (uv) after 60s  12.5hz 5on/5off to inhibit muscle tone x 15 minutes  x 17 uv/ma  After 4.9    Glides to adductors/abdominals  Internal trp stretching and release superficial, mid, deep pelvic floor

## 2024-06-14 ENCOUNTER — APPOINTMENT (OUTPATIENT)
Dept: PHYSICAL THERAPY | Facility: CLINIC | Age: 34
End: 2024-06-14
Payer: COMMERCIAL

## 2024-06-20 ENCOUNTER — APPOINTMENT (OUTPATIENT)
Dept: PHYSICAL THERAPY | Facility: CLINIC | Age: 34
End: 2024-06-20
Payer: COMMERCIAL

## 2024-06-28 ENCOUNTER — TREATMENT (OUTPATIENT)
Dept: PHYSICAL THERAPY | Facility: CLINIC | Age: 34
End: 2024-06-28
Payer: COMMERCIAL

## 2024-06-28 DIAGNOSIS — N32.81 OAB (OVERACTIVE BLADDER): ICD-10-CM

## 2024-06-28 DIAGNOSIS — M62.89 PELVIC FLOOR DYSFUNCTION: Primary | ICD-10-CM

## 2024-06-28 DIAGNOSIS — N39.41 URGE INCONTINENCE OF URINE: ICD-10-CM

## 2024-06-28 PROCEDURE — 97140 MANUAL THERAPY 1/> REGIONS: CPT | Mod: GP | Performed by: PHYSICAL THERAPIST

## 2024-06-28 PROCEDURE — 97112 NEUROMUSCULAR REEDUCATION: CPT | Mod: GP | Performed by: PHYSICAL THERAPIST

## 2024-06-28 NOTE — PROGRESS NOTES
Physical Therapy Treatment    Patient Name: Sanjuana Lawson  MRN: [id number]  Today's Date: 6/28/2024         Assessment:  Improved tone noted    Plan:  Progress as able    Current Problem  1. Pelvic floor dysfunction        2. Urge incontinence of urine        3. OAB (overactive bladder)                Subjective   Patient notes good days and bad days    Objective   See below  Increased tone at superficial layer    Treatments:  Neuromuscular re-education using internal sensor placed in vagina, patient supine, knees supported.  Resting tone =   6.5  microvolts (uv) after 60s  12.5hz 5on/5off to inhibit muscle tone x 15 minutes  x 20 uv/ma  After 2.7    Glides to adductors/abdominals  Internal trp stretching and release superficial, mid, deep pelvic floor

## 2024-07-03 ENCOUNTER — TREATMENT (OUTPATIENT)
Dept: PHYSICAL THERAPY | Facility: CLINIC | Age: 34
End: 2024-07-03
Payer: COMMERCIAL

## 2024-07-03 DIAGNOSIS — R35.0 URINARY FREQUENCY: Primary | ICD-10-CM

## 2024-07-03 DIAGNOSIS — M62.89 PELVIC FLOOR DYSFUNCTION: ICD-10-CM

## 2024-07-03 DIAGNOSIS — N39.41 URGE INCONTINENCE OF URINE: ICD-10-CM

## 2024-07-03 PROCEDURE — 97112 NEUROMUSCULAR REEDUCATION: CPT | Mod: GP | Performed by: PHYSICAL THERAPIST

## 2024-07-03 PROCEDURE — 97140 MANUAL THERAPY 1/> REGIONS: CPT | Mod: GP | Performed by: PHYSICAL THERAPIST

## 2024-07-03 NOTE — PROGRESS NOTES
Physical Therapy Treatment    Patient Name: Sanjuana Lawson  MRN: [id number]  Today's Date: 7/3/2024         Assessment:  Improved tone noted    Plan:  Progress as able    Current Problem  1. Urinary frequency        2. Urge incontinence of urine        3. Pelvic floor dysfunction                  Subjective   Patient notes good days and bad days    Objective   See below  Poison Ivy over trunk - on prednisone    Treatments:  Neuromuscular re-education using internal sensor placed in vagina, patient supine, knees supported.  Resting tone =   4.4  microvolts (uv) after 60s  12.5hz 5on/5off to inhibit muscle tone x 15 minutes  x 20 uv/ma  After 2.7    Held abdominal and adductor work due to poison ivy  Internal trp stretching and release superficial, mid, deep pelvic floor

## 2024-07-10 ENCOUNTER — ANESTHESIA EVENT (OUTPATIENT)
Dept: OPERATING ROOM | Facility: HOSPITAL | Age: 34
End: 2024-07-10
Payer: COMMERCIAL

## 2024-07-12 ENCOUNTER — TREATMENT (OUTPATIENT)
Dept: PHYSICAL THERAPY | Facility: CLINIC | Age: 34
End: 2024-07-12
Payer: COMMERCIAL

## 2024-07-12 DIAGNOSIS — K59.04 CHRONIC IDIOPATHIC CONSTIPATION: ICD-10-CM

## 2024-07-12 DIAGNOSIS — N32.81 OAB (OVERACTIVE BLADDER): ICD-10-CM

## 2024-07-12 DIAGNOSIS — R35.0 URINARY FREQUENCY: Primary | ICD-10-CM

## 2024-07-12 DIAGNOSIS — M62.89 PELVIC FLOOR DYSFUNCTION: ICD-10-CM

## 2024-07-12 DIAGNOSIS — N39.41 URGE INCONTINENCE OF URINE: ICD-10-CM

## 2024-07-12 PROCEDURE — 97140 MANUAL THERAPY 1/> REGIONS: CPT | Mod: GP | Performed by: PHYSICAL THERAPIST

## 2024-07-12 PROCEDURE — 97112 NEUROMUSCULAR REEDUCATION: CPT | Mod: GP | Performed by: PHYSICAL THERAPIST

## 2024-07-12 RX ORDER — DROPERIDOL 2.5 MG/ML
0.62 INJECTION, SOLUTION INTRAMUSCULAR; INTRAVENOUS ONCE AS NEEDED
Status: CANCELLED | OUTPATIENT
Start: 2024-07-12

## 2024-07-12 RX ORDER — ONDANSETRON HYDROCHLORIDE 2 MG/ML
4 INJECTION, SOLUTION INTRAVENOUS ONCE AS NEEDED
Status: CANCELLED | OUTPATIENT
Start: 2024-07-12

## 2024-07-12 RX ORDER — SODIUM CHLORIDE, SODIUM LACTATE, POTASSIUM CHLORIDE, CALCIUM CHLORIDE 600; 310; 30; 20 MG/100ML; MG/100ML; MG/100ML; MG/100ML
100 INJECTION, SOLUTION INTRAVENOUS CONTINUOUS
Status: CANCELLED | OUTPATIENT
Start: 2024-07-12

## 2024-07-12 RX ORDER — OXYCODONE HYDROCHLORIDE 5 MG/1
5 TABLET ORAL EVERY 4 HOURS PRN
Status: CANCELLED | OUTPATIENT
Start: 2024-07-12

## 2024-07-12 NOTE — PROGRESS NOTES
Physical Therapy Treatment    Patient Name: Sanjuana Lawson  MRN: [id number]  Today's Date: 7/12/2024         Assessment:  Improved tone noted    Plan:  Progress as able    Current Problem  1. Urinary frequency        2. Pelvic floor dysfunction        3. Urge incontinence of urine        4. OAB (overactive bladder)        5. Chronic idiopathic constipation            Subjective   Patient notes a very bad day with leaking yesterday.     Objective   See below  Poison Ivy over trunk - on prednisone    Treatments:  Neuromuscular re-education using internal sensor placed in vagina, patient supine, knees supported.  Resting tone =   5.5  microvolts (uv) after 60s  12.5hz 5on/5off to inhibit muscle tone x 15 minutes  x 20 uv/ma  After 3.1    Internal trp stretching and release superficial, mid, deep pelvic floor

## 2024-07-15 ENCOUNTER — HOSPITAL ENCOUNTER (OUTPATIENT)
Facility: HOSPITAL | Age: 34
Setting detail: OUTPATIENT SURGERY
Discharge: HOME | End: 2024-07-15
Attending: STUDENT IN AN ORGANIZED HEALTH CARE EDUCATION/TRAINING PROGRAM | Admitting: STUDENT IN AN ORGANIZED HEALTH CARE EDUCATION/TRAINING PROGRAM
Payer: COMMERCIAL

## 2024-07-15 ENCOUNTER — ANESTHESIA (OUTPATIENT)
Dept: OPERATING ROOM | Facility: HOSPITAL | Age: 34
End: 2024-07-15
Payer: COMMERCIAL

## 2024-07-15 VITALS
HEART RATE: 52 BPM | SYSTOLIC BLOOD PRESSURE: 116 MMHG | WEIGHT: 158.51 LBS | BODY MASS INDEX: 23.48 KG/M2 | OXYGEN SATURATION: 100 % | RESPIRATION RATE: 16 BRPM | DIASTOLIC BLOOD PRESSURE: 76 MMHG | TEMPERATURE: 97.2 F | HEIGHT: 69 IN

## 2024-07-15 DIAGNOSIS — M62.89 PELVIC FLOOR DYSFUNCTION: ICD-10-CM

## 2024-07-15 DIAGNOSIS — N32.81 OAB (OVERACTIVE BLADDER): Primary | ICD-10-CM

## 2024-07-15 DIAGNOSIS — G89.18 POSTOPERATIVE PAIN: ICD-10-CM

## 2024-07-15 DIAGNOSIS — R52 POSTPARTUM PAIN (HHS-HCC): ICD-10-CM

## 2024-07-15 LAB — PREGNANCY TEST URINE, POC: NEGATIVE

## 2024-07-15 PROCEDURE — 3700000002 HC GENERAL ANESTHESIA TIME - EACH INCREMENTAL 1 MINUTE: Performed by: STUDENT IN AN ORGANIZED HEALTH CARE EDUCATION/TRAINING PROGRAM

## 2024-07-15 PROCEDURE — 2500000004 HC RX 250 GENERAL PHARMACY W/ HCPCS (ALT 636 FOR OP/ED): Mod: JZ | Performed by: STUDENT IN AN ORGANIZED HEALTH CARE EDUCATION/TRAINING PROGRAM

## 2024-07-15 PROCEDURE — 2500000004 HC RX 250 GENERAL PHARMACY W/ HCPCS (ALT 636 FOR OP/ED): Performed by: NURSE ANESTHETIST, CERTIFIED REGISTERED

## 2024-07-15 PROCEDURE — 2500000001 HC RX 250 WO HCPCS SELF ADMINISTERED DRUGS (ALT 637 FOR MEDICARE OP): Performed by: STUDENT IN AN ORGANIZED HEALTH CARE EDUCATION/TRAINING PROGRAM

## 2024-07-15 PROCEDURE — 3700000001 HC GENERAL ANESTHESIA TIME - INITIAL BASE CHARGE: Performed by: STUDENT IN AN ORGANIZED HEALTH CARE EDUCATION/TRAINING PROGRAM

## 2024-07-15 PROCEDURE — 2500000005 HC RX 250 GENERAL PHARMACY W/O HCPCS: Performed by: NURSE ANESTHETIST, CERTIFIED REGISTERED

## 2024-07-15 PROCEDURE — 7100000009 HC PHASE TWO TIME - INITIAL BASE CHARGE: Performed by: STUDENT IN AN ORGANIZED HEALTH CARE EDUCATION/TRAINING PROGRAM

## 2024-07-15 PROCEDURE — 3600000007 HC OR TIME - EACH INCREMENTAL 1 MINUTE - PROCEDURE LEVEL TWO: Performed by: STUDENT IN AN ORGANIZED HEALTH CARE EDUCATION/TRAINING PROGRAM

## 2024-07-15 PROCEDURE — 2500000004 HC RX 250 GENERAL PHARMACY W/ HCPCS (ALT 636 FOR OP/ED): Performed by: ANESTHESIOLOGY

## 2024-07-15 PROCEDURE — 64430 NJX AA&/STRD PUDENDAL NERVE: CPT | Performed by: STUDENT IN AN ORGANIZED HEALTH CARE EDUCATION/TRAINING PROGRAM

## 2024-07-15 PROCEDURE — 7100000010 HC PHASE TWO TIME - EACH INCREMENTAL 1 MINUTE: Performed by: STUDENT IN AN ORGANIZED HEALTH CARE EDUCATION/TRAINING PROGRAM

## 2024-07-15 PROCEDURE — 3600000002 HC OR TIME - INITIAL BASE CHARGE - PROCEDURE LEVEL TWO: Performed by: STUDENT IN AN ORGANIZED HEALTH CARE EDUCATION/TRAINING PROGRAM

## 2024-07-15 PROCEDURE — 81025 URINE PREGNANCY TEST: CPT | Performed by: ANESTHESIOLOGY

## 2024-07-15 PROCEDURE — 96372 THER/PROPH/DIAG INJ SC/IM: CPT | Performed by: STUDENT IN AN ORGANIZED HEALTH CARE EDUCATION/TRAINING PROGRAM

## 2024-07-15 PROCEDURE — 20553 NJX 1/MLT TRIGGER POINTS 3/>: CPT | Performed by: STUDENT IN AN ORGANIZED HEALTH CARE EDUCATION/TRAINING PROGRAM

## 2024-07-15 RX ORDER — ACETAMINOPHEN 325 MG/1
975 TABLET ORAL ONCE
Status: COMPLETED | OUTPATIENT
Start: 2024-07-15 | End: 2024-07-15

## 2024-07-15 RX ORDER — IBUPROFEN 600 MG/1
600 TABLET ORAL EVERY 6 HOURS PRN
Qty: 40 TABLET | Refills: 0 | Status: SHIPPED | OUTPATIENT
Start: 2024-07-15 | End: 2024-07-25

## 2024-07-15 RX ORDER — PROPOFOL 10 MG/ML
INJECTION, EMULSION INTRAVENOUS AS NEEDED
Status: DISCONTINUED | OUTPATIENT
Start: 2024-07-15 | End: 2024-07-15

## 2024-07-15 RX ORDER — LIDOCAINE HYDROCHLORIDE 10 MG/ML
INJECTION INFILTRATION; PERINEURAL AS NEEDED
Status: DISCONTINUED | OUTPATIENT
Start: 2024-07-15 | End: 2024-07-15

## 2024-07-15 RX ORDER — SODIUM CHLORIDE 0.9 % (FLUSH) 0.9 %
SYRINGE (ML) INJECTION AS NEEDED
Status: DISCONTINUED | OUTPATIENT
Start: 2024-07-15 | End: 2024-07-15 | Stop reason: HOSPADM

## 2024-07-15 RX ORDER — ACETAMINOPHEN 500 MG
1000 TABLET ORAL EVERY 6 HOURS PRN
Qty: 60 TABLET | Refills: 0 | Status: SHIPPED | OUTPATIENT
Start: 2024-07-15 | End: 2024-07-25

## 2024-07-15 RX ORDER — CELECOXIB 400 MG/1
400 CAPSULE ORAL ONCE
Status: COMPLETED | OUTPATIENT
Start: 2024-07-15 | End: 2024-07-15

## 2024-07-15 RX ORDER — FENTANYL CITRATE 50 UG/ML
INJECTION, SOLUTION INTRAMUSCULAR; INTRAVENOUS AS NEEDED
Status: DISCONTINUED | OUTPATIENT
Start: 2024-07-15 | End: 2024-07-15

## 2024-07-15 RX ORDER — GABAPENTIN 300 MG/1
600 CAPSULE ORAL ONCE
Status: DISCONTINUED | OUTPATIENT
Start: 2024-07-15 | End: 2024-07-15 | Stop reason: HOSPADM

## 2024-07-15 RX ORDER — SODIUM CHLORIDE, SODIUM LACTATE, POTASSIUM CHLORIDE, CALCIUM CHLORIDE 600; 310; 30; 20 MG/100ML; MG/100ML; MG/100ML; MG/100ML
100 INJECTION, SOLUTION INTRAVENOUS CONTINUOUS
Status: DISCONTINUED | OUTPATIENT
Start: 2024-07-15 | End: 2024-07-15 | Stop reason: HOSPADM

## 2024-07-15 RX ORDER — ONDANSETRON HYDROCHLORIDE 2 MG/ML
INJECTION, SOLUTION INTRAVENOUS AS NEEDED
Status: DISCONTINUED | OUTPATIENT
Start: 2024-07-15 | End: 2024-07-15

## 2024-07-15 RX ORDER — MIDAZOLAM HYDROCHLORIDE 1 MG/ML
INJECTION, SOLUTION INTRAMUSCULAR; INTRAVENOUS AS NEEDED
Status: DISCONTINUED | OUTPATIENT
Start: 2024-07-15 | End: 2024-07-15

## 2024-07-15 SDOH — HEALTH STABILITY: MENTAL HEALTH: CURRENT SMOKER: 0

## 2024-07-15 ASSESSMENT — PAIN SCALES - GENERAL
PAIN_LEVEL: 2
PAINLEVEL_OUTOF10: 0 - NO PAIN

## 2024-07-15 ASSESSMENT — COLUMBIA-SUICIDE SEVERITY RATING SCALE - C-SSRS
1. IN THE PAST MONTH, HAVE YOU WISHED YOU WERE DEAD OR WISHED YOU COULD GO TO SLEEP AND NOT WAKE UP?: NO
2. HAVE YOU ACTUALLY HAD ANY THOUGHTS OF KILLING YOURSELF?: NO
6. HAVE YOU EVER DONE ANYTHING, STARTED TO DO ANYTHING, OR PREPARED TO DO ANYTHING TO END YOUR LIFE?: NO

## 2024-07-15 NOTE — ANESTHESIA PREPROCEDURE EVALUATION
Patient: Sanjuana Lawson    Procedure Information       Date/Time: 07/15/24 1330    Procedure: CYSTOSCOPY WITH PELVIC FLOOR INJECTION, BOTOX 200 UNITS - 15 min.     please put on for first available february or march    Location: [facility] 07 / [facility]    Surgeons: Hernandez Woods MD            Relevant Problems   Cardiac   (+) Chronic right-sided thoracic back pain      Pulmonary   (+) Mild intermittent asthma (HHS-HCC)      Neuro   (+) Premenstrual dysphoric disorder      Endocrine   (+) Acquired hypothyroidism      Hematology   (+) Thrombocytopenia (CMS-HCC)       Clinical information reviewed:   Tobacco  Allergies  Meds  Problems  Med Hx  Surg Hx  OB Status    Fam Hx  Soc Hx        NPO Detail:  NPO/Void Status  Date of Last Solid: 07/15/24  Time of Last Solid: 0000         Physical Exam    Airway  Mallampati: II  TM distance: >3 FB     Cardiovascular - normal exam     Dental - normal exam     Pulmonary - normal exam     Abdominal            Anesthesia Plan    History of general anesthesia?: yes  History of complications of general anesthesia?: no    ASA 2     MAC     The patient is not a current smoker.    intravenous induction   Anesthetic plan and risks discussed with patient.  Use of blood products discussed with who consented to blood products.    Plan discussed with CRNA and attending.

## 2024-07-15 NOTE — DISCHARGE INSTRUCTIONS
Call Dr. Woods's office for any problems and/or concerns    *Walk as much as possible, it is ok to use stairs carefully  *Ok to shower, avoid soaking in tub baths or swimming for 4-6 weeks after surgery   *Continue the coughing and deep breathing exercises that your learned in the hospital  *No lifting/straining and avoid constipation for 4-6 weeks (Avoid strenuous activity)  *Prevent constipation by using stool softeners and staying hydrated, so that you do not strain against your stitches or have pain from constipation  *Vaginal rest for 6 weeks (Do not put anything in your vagina except prescribed vaginal estrogen. Do not use tampons or douches. Do not have sex until cleared by physician)    Call Doctor right away for:    *Fever above 100.4/shaking chills  *Bright red vaginal bleeding or bleeding that soaks more than one sanitary pad per hour  *A foul smelling discharge from the vagina  *Trouble urinating or burning with urination  *Severe pain or bloating in your abdomen  *Persistent nausea/vomiting  *Redness, swelling, or drainage at your incision sites  *Chest pain/shortness of breath-call 911.    - You will be going home with prescriptions for pain medication. If you are able to take them, alternate a dose of Acetaminophen (Tylenol) and Ketorolac (Toradol) every 3 hours. (For example, 1000mg of Tylenol at 09:00am, 600mg Toradol at 12:00pm, 1000mg of Tylenol at 3:00pm, 600mg Toradol at 6:00pm).   - Use any prescribed narcotic (ie Tramadol) for breakthrough pain as prescribed.   - Use a stool softener, such as Miralax, to prevent constipation from the narcotic medication.   - If you are going home with or already have a prescription for estrogen cream, you may begin/resume use vaginally as prescribed nightly until your 2 week post-op visit.

## 2024-07-15 NOTE — ANESTHESIA POSTPROCEDURE EVALUATION
Patient: Sanjuana Lawson    Procedure Summary       Date: 07/15/24 Room / Location: [facility] 07 / [facility]    Anesthesia Start: 1430 Anesthesia Stop: 1507    Procedure: CYSTOSCOPY WITH PELVIC FLOOR INJECTION, BOTOX 200 UNITS Diagnosis:       Pelvic floor dysfunction      (Pelvic floor dysfunction [M62.89])    Surgeons: Hernandez Woods MD Responsible Provider: Itz Moody MD    Anesthesia Type: MAC ASA Status: 2            Anesthesia Type: MAC    Vitals Value Taken Time   /76 07/15/24 1555   Temp 36.2 °C (97.2 °F) 07/15/24 1503   Pulse 52 07/15/24 1555   Resp 16 07/15/24 1555   SpO2 100 % 07/15/24 1555       Anesthesia Post Evaluation    Patient location during evaluation: PACU  Patient participation: complete - patient participated  Level of consciousness: awake  Pain score: 2  Pain management: adequate  Multimodal analgesia pain management approach  Airway patency: patent  Two or more strategies used to mitigate risk of obstructive sleep apnea  Cardiovascular status: acceptable  Respiratory status: acceptable  Hydration status: acceptable  Postoperative Nausea and Vomiting: none    No notable events documented.

## 2024-07-15 NOTE — OP NOTE
CYSTOSCOPY WITH PELVIC FLOOR INJECTION, BOTOX 200 UNITS Operative Note     Date: 7/15/2024  OR Location: [facility] OR    Name: Sanjuana Lawson, : 1990, Age: 33 y.o., MRN: [id number], Sex: female    Diagnosis  Pre-op Diagnosis      * Pelvic floor dysfunction [M62.89] Post-op Diagnosis     * Pelvic floor dysfunction [M62.89]     Procedures  Pelvic floor botox 200 units    Pundendal nerve block    Surgeons      * Hernandez Woods - Primary    Resident/Fellow/Other Assistant:  Surgeons and Role:  * No surgeons found with a matching role *    Procedure Summary  Anesthesia: Anesthesia type not filed in the log.  ASA: II  Anesthesia Staff: Anesthesiologist: Itz Moody MD  CRNA: CAIN Valdes  Estimated Blood Loss: 5 mL  Intra-op Medications: Administrations occurring from 1330 to 1415 on 07/15/24:  * No intraprocedure medications in log *           Anesthesia Record               Intraprocedure I/O Totals          Intake    Propofol Drip 0.00 mL    The total shown is the total volume documented since Anesthesia Start was filed.    lactated Ringer's infusion 500.00 mL    Total Intake 500 mL          Specimen: No specimens collected     Staff:   Circulator: Laly  Scrub Person: Ernestina  Scrub Person: Alden WHITAKER: Itz         Drains and/or Catheters: * None in log *    Tourniquet Times:         Implants:     Findings: normal bladder    Indications: Sanjuana Lawson is an 33 y.o. female who is having surgery for Pelvic floor dysfunction [M62.89].     The patient was seen in the preoperative area. The risks, benefits, complications, treatment options, non-operative alternatives, expected recovery and outcomes were discussed with the patient. The possibilities of reaction to medication, pulmonary aspiration, injury to surrounding structures, bleeding, recurrent infection, the need for additional procedures, failure to diagnose a condition, and creating a complication requiring transfusion or operation were discussed  with the patient. The patient concurred with the proposed plan, giving informed consent.  The site of surgery was properly noted/marked if necessary per policy. The patient has been actively warmed in preoperative area. Preoperative antibiotics have been ordered and given within 1 hours of incision. Venous thrombosis prophylaxis have been ordered including bilateral sequential compression devices    Procedure Details: I performed trigger point injections in the levator muscles, coccygeus, obturator internus and pyriformis, injecting 100 units injection in 10 cc of normal saline on the right side. The same done on the left side    The vagina was cleaned with betadine preparation. Then 9 cc of 1% lidocaine and 1 cc/40 mg kenalog was injected medial and superior to the ischial spine, injecting 5 cc at either site.      Complications:  None; patient tolerated the procedure well.    Disposition: PACU - hemodynamically stable.  Condition: stable         Additional Details:     Attending Attestation: I was present and scrubbed for the entire procedure.    Hernandez Woods  Phone Number: [phone]

## 2024-07-15 NOTE — HOSPITAL COURSE
34 yo with urge incontinence and frequency, here for pelvic floor botox (200U).    Has tried virtually every therapy including neuromodulation with sacral implant and PTNS, currently has the InterStim in place, Botox, all 7 medications,     Cystoscopy normal    Currently in PFPT and taking vaginal valium.    PMH: asthma, hypothyroidism  PSH: none

## 2024-07-15 NOTE — H&P
History Of Present Illness  Sanjuana Lawson is a 33 y.o. female with urge incontinence and frequency, here for pelvic floor botox (200U).    Has tried virtually every therapy including neuromodulation with sacral implant and PTNS, currently has the InterStim in place, Botox, all 7 medications,     Cystoscopy normal    Currently in PFPT and taking vaginal valium.     Past Medical History  Past Medical History:   Diagnosis Date    History of chickenpox     History of vaccination against human papillomavirus     Narcolepsy ([facility])     OAB (overactive bladder)     Other conditions influencing health status     Uncomplicated asthma, unspecified asthma severity    Pelvic floor dysfunction     Personal history of other infectious and parasitic diseases     S/P Botox injection 09/20/2018    Sleep apnea        Surgical History  No past surgical history on file.     Social History  She reports that she has never smoked. She has never used smokeless tobacco. She reports current alcohol use of about 1.0 standard drink of alcohol per week. She reports that she does not use drugs.    Family History  Family History   Problem Relation Name Age of Onset    No Known Problems Mother      No Known Problems Father      Lung cancer Father's Brother          unspecified laterally, unspecified part of lung    Breast cancer Maternal Grandmother          unspecified site of breast, unspecified laterally    Other (cva) Maternal Grandfather          due to embolism of precebral artery    Breast cancer Paternal Grandfather          unspecified laterally, unspecified site of breast    Cancer Other multiple family members     Hypertension Other multiple family members         Allergies  Patient has no known allergies.       Physical Exam  General: Alert and oriented, in NAD  Neuro: Awake, alert, conversational  CV: Regular rate  Respiratory: Even and unlabored on RA  Extremities: Full ROM  Psych: appropriate mood and affect       Last Recorded  "Vitals  Blood pressure 121/71, pulse 73, temperature 36.5 °C (97.7 °F), resp. rate 16, height 1.753 m (5' 9\"), weight 71.9 kg (158 lb 8.2 oz), SpO2 98%.    Relevant Results  Lab Results   Component Value Date    WBC 5.3 08/09/2022    HGB 14.4 08/09/2022    HCT 43.7 08/09/2022    MCV 90.5 08/09/2022     (L) 08/09/2022     Assessment/Plan   - Proceed with pelvic floor botox injection (200U)  - Consents to be singed at bedside    To be Discussed with Dr. Peggy Guthrie MD, PGY-2    "

## 2024-07-26 ENCOUNTER — TREATMENT (OUTPATIENT)
Dept: PHYSICAL THERAPY | Facility: CLINIC | Age: 34
End: 2024-07-26
Payer: COMMERCIAL

## 2024-07-26 DIAGNOSIS — K59.09 OTHER CONSTIPATION: ICD-10-CM

## 2024-07-26 DIAGNOSIS — N39.41 URGE INCONTINENCE OF URINE: ICD-10-CM

## 2024-07-26 DIAGNOSIS — M62.89 PELVIC FLOOR DYSFUNCTION: Primary | ICD-10-CM

## 2024-07-26 PROCEDURE — 97140 MANUAL THERAPY 1/> REGIONS: CPT | Mod: GP | Performed by: PHYSICAL THERAPIST

## 2024-07-26 PROCEDURE — 97535 SELF CARE MNGMENT TRAINING: CPT | Mod: GP | Performed by: PHYSICAL THERAPIST

## 2024-07-26 NOTE — PROGRESS NOTES
Physical Therapy Treatment    Patient Name: Sanjuana Lawson  MRN: [id number]  Today's Date: 7/26/2024         Assessment:  Improved tone noted    Plan:  Progress as able    Current Problem  1. Pelvic floor dysfunction        2. Urge incontinence of urine        3. Other constipation              Subjective   Patient had Botox 7/15/24  Major urinary leaking 7/19/24  Patient feels she cannot control the urine   Overall better - NTF has improved  Going less, decreased urge with water consumption    Objective   No gait antalgia    Treatments:  Discussed that botox can take 2 weeks to understand full effect.     Abdominal glides, adductors glides.  Internal trp stretching and release superficial, mid, deep pelvic floor

## 2024-07-31 ENCOUNTER — TREATMENT (OUTPATIENT)
Dept: PHYSICAL THERAPY | Facility: CLINIC | Age: 34
End: 2024-07-31
Payer: COMMERCIAL

## 2024-07-31 DIAGNOSIS — R35.0 URINARY FREQUENCY: ICD-10-CM

## 2024-07-31 DIAGNOSIS — N39.41 URGE INCONTINENCE OF URINE: Primary | ICD-10-CM

## 2024-07-31 DIAGNOSIS — M62.89 PELVIC FLOOR DYSFUNCTION: ICD-10-CM

## 2024-07-31 PROCEDURE — 97140 MANUAL THERAPY 1/> REGIONS: CPT | Mod: GP | Performed by: PHYSICAL THERAPIST

## 2024-07-31 NOTE — PROGRESS NOTES
Physical Therapy Treatment    Patient Name: Sanjuana Lawson  MRN: [id number]  Today's Date: 7/31/2024         Assessment:  Patient notes feeling lighter    Plan:  Progress as able    Current Problem  1. Urge incontinence of urine        2. Urinary frequency        3. Pelvic floor dysfunction                Subjective   Patient had Botox 7/15/24  Urge with urine loss continues but not as frequently  Going less, decreased urge with water consumption    Objective   No gait antalgia    Treatments:    Patient was educated on risks and benefits associated with dry needling, what to expect, and post-procedure protocol (i.e. increased water intake, increased stretching, etc.).  Patient educated on signs and symptoms associated with pneumothorax and to go to ED if patient were to start experiencing these after being needled in thorax area.  Patient was also educated on modifications to HEP.  Verbal consent was received to proceed with treatment  .22x15 mm along suprapubic line/urachus    Bladder mobilization  Urachus release  Glides to adductors

## 2024-08-14 ENCOUNTER — TREATMENT (OUTPATIENT)
Dept: PHYSICAL THERAPY | Facility: CLINIC | Age: 34
End: 2024-08-14
Payer: COMMERCIAL

## 2024-08-14 DIAGNOSIS — M62.89 PELVIC FLOOR DYSFUNCTION: Primary | ICD-10-CM

## 2024-08-14 DIAGNOSIS — R35.0 URINARY FREQUENCY: ICD-10-CM

## 2024-08-14 DIAGNOSIS — N39.41 URGE INCONTINENCE OF URINE: ICD-10-CM

## 2024-08-14 PROCEDURE — 97140 MANUAL THERAPY 1/> REGIONS: CPT | Mod: GP | Performed by: PHYSICAL THERAPIST

## 2024-08-14 PROCEDURE — 97112 NEUROMUSCULAR REEDUCATION: CPT | Mod: GP | Performed by: PHYSICAL THERAPIST

## 2024-08-14 NOTE — PROGRESS NOTES
Physical Therapy Treatment    Patient Name: Sanjuana Lawson  MRN: [id number]  Today's Date: 8/14/2024         Assessment:  Patient notes feeling lighter, less symptomatic    Plan:  Progress as able    Current Problem  1. Pelvic floor dysfunction        2. Urinary frequency        3. Urge incontinence of urine                  Subjective   Patient had Botox 7/15/24  Urge continues to be very strong -   Follows up Dr. Woods in September   Patient had bad side effects with OAB medication     Objective   No gait antalgia    Treatments:  Neuromuscular re-education using internal sensor placed in vagina, patient supine, knees supported.  Resting tone =   3.8     microvolts (uv) after 60s  12.5hz 5on/5off to inhibit muscle tone x 15 minutes x 11 uv/ma      Glides to adductors  Abdomen to decrease tension       
no/menopausal

## 2024-08-22 ENCOUNTER — TREATMENT (OUTPATIENT)
Dept: PHYSICAL THERAPY | Facility: CLINIC | Age: 34
End: 2024-08-22
Payer: COMMERCIAL

## 2024-08-22 DIAGNOSIS — M62.89 PELVIC FLOOR DYSFUNCTION: Primary | ICD-10-CM

## 2024-08-22 DIAGNOSIS — N39.41 URGE INCONTINENCE OF URINE: ICD-10-CM

## 2024-08-22 PROCEDURE — 97112 NEUROMUSCULAR REEDUCATION: CPT | Mod: GP | Performed by: PHYSICAL THERAPIST

## 2024-08-22 PROCEDURE — 97140 MANUAL THERAPY 1/> REGIONS: CPT | Mod: GP | Performed by: PHYSICAL THERAPIST

## 2024-08-22 NOTE — PROGRESS NOTES
Physical Therapy Treatment    Patient Name: Sanjuana Lawson  MRN: [id number]  Today's Date: 8/22/2024         Assessment:  Improved resting tone today    Plan:  Progress as able    Current Problem  1. Pelvic floor dysfunction        2. Urge incontinence of urine                    Subjective   Good days/bad days     Objective   No gait antalgia    Treatments:  Neuromuscular re-education using internal sensor placed in vagina, patient supine, knees supported.  Resting tone =   1.9    microvolts (uv) after 60s  12.5hz 5on/5off to inhibit muscle tone x 15 minutes x 20 uv/ma  .8 muv after treatment    Glides to adductors  Abdomen to decrease tension

## 2024-08-23 ENCOUNTER — TELEPHONE (OUTPATIENT)
Dept: UROLOGY | Facility: CLINIC | Age: 34
End: 2024-08-23
Payer: COMMERCIAL

## 2024-08-28 ENCOUNTER — TREATMENT (OUTPATIENT)
Dept: PHYSICAL THERAPY | Facility: CLINIC | Age: 34
End: 2024-08-28
Payer: COMMERCIAL

## 2024-08-28 DIAGNOSIS — K59.00 CONSTIPATION: ICD-10-CM

## 2024-08-28 DIAGNOSIS — M62.89 PELVIC FLOOR DYSFUNCTION: ICD-10-CM

## 2024-08-28 DIAGNOSIS — R35.0 URINARY FREQUENCY: ICD-10-CM

## 2024-08-28 DIAGNOSIS — N39.41 URGE INCONTINENCE OF URINE: Primary | ICD-10-CM

## 2024-08-28 PROCEDURE — 97535 SELF CARE MNGMENT TRAINING: CPT | Mod: GP | Performed by: PHYSICAL THERAPIST

## 2024-08-28 PROCEDURE — 97140 MANUAL THERAPY 1/> REGIONS: CPT | Mod: GP | Performed by: PHYSICAL THERAPIST

## 2024-08-28 PROCEDURE — 97112 NEUROMUSCULAR REEDUCATION: CPT | Mod: GP | Performed by: PHYSICAL THERAPIST

## 2024-08-28 NOTE — PROGRESS NOTES
Physical Therapy Treatment    Patient Name: Sanjuana Lawson  MRN: [id number]  Today's Date: 8/28/2024         Assessment:  Encouraged patient to practice distraction/urge suppression techniques    Plan:  Progress as able    Current Problem  1. Urge incontinence of urine        2. Urinary frequency        3. Constipation        4. Pelvic floor dysfunction                      Subjective   Good days/bad days   Patient able to delay urge at work when called out for a work emergency  Objective   No gait antalgia    Treatments:  Neuromuscular re-education using internal sensor placed in vagina, patient supine, knees supported.  Resting tone =  3.1  microvolts (uv) after 60s  12.5hz 5on/5off to inhibit muscle tone x 15 minutes x 22 uv/ma  .8 muv after treatment    Glides to adductors  Abdomen to decrease tension

## 2024-09-03 ENCOUNTER — TREATMENT (OUTPATIENT)
Dept: PHYSICAL THERAPY | Facility: CLINIC | Age: 34
End: 2024-09-03
Payer: COMMERCIAL

## 2024-09-03 DIAGNOSIS — R35.0 URINARY FREQUENCY: ICD-10-CM

## 2024-09-03 DIAGNOSIS — N39.41 URGE INCONTINENCE OF URINE: ICD-10-CM

## 2024-09-03 DIAGNOSIS — M62.89 PELVIC FLOOR DYSFUNCTION: ICD-10-CM

## 2024-09-03 DIAGNOSIS — N32.81 OAB (OVERACTIVE BLADDER): Primary | ICD-10-CM

## 2024-09-03 PROCEDURE — 97140 MANUAL THERAPY 1/> REGIONS: CPT | Mod: GP | Performed by: PHYSICAL THERAPIST

## 2024-09-03 NOTE — PROGRESS NOTES
Physical Therapy Treatment    Patient Name: Sanjuana Lawson  MRN: [id number]  Today's Date: 9/3/2024         Assessment:  Encouraged patient to practice distraction/urge suppression techniques and do pelvic floor deep squat stretch multiple times a day    Plan:  Progress as able    Current Problem  1. OAB (overactive bladder)        2. Pelvic floor dysfunction        3. Urinary frequency        4. Urge incontinence of urine                        Subjective   Last week was a very bad week with several major leaks  Objective   No gait antalgia    Treatments:    Glides to adductors  Abdomen to decrease tension   Internal TrP release

## 2024-09-05 ENCOUNTER — APPOINTMENT (OUTPATIENT)
Dept: UROLOGY | Facility: CLINIC | Age: 34
End: 2024-09-05
Payer: COMMERCIAL

## 2024-09-12 ENCOUNTER — APPOINTMENT (OUTPATIENT)
Dept: GASTROENTEROLOGY | Facility: CLINIC | Age: 34
End: 2024-09-12
Payer: COMMERCIAL

## 2024-09-17 ENCOUNTER — TREATMENT (OUTPATIENT)
Dept: PHYSICAL THERAPY | Facility: CLINIC | Age: 34
End: 2024-09-17
Payer: COMMERCIAL

## 2024-09-17 DIAGNOSIS — M62.89 PELVIC FLOOR DYSFUNCTION: ICD-10-CM

## 2024-09-17 DIAGNOSIS — K59.04 CHRONIC IDIOPATHIC CONSTIPATION: Primary | ICD-10-CM

## 2024-09-17 DIAGNOSIS — N39.41 URGE INCONTINENCE OF URINE: ICD-10-CM

## 2024-09-17 PROCEDURE — 97112 NEUROMUSCULAR REEDUCATION: CPT | Mod: GP | Performed by: PHYSICAL THERAPIST

## 2024-09-17 PROCEDURE — 97140 MANUAL THERAPY 1/> REGIONS: CPT | Mod: GP | Performed by: PHYSICAL THERAPIST

## 2024-09-17 NOTE — PROGRESS NOTES
Physical Therapy Treatment    Patient Name: Sanjuana Lawson  MRN: [id number]  Today's Date: 9/17/2024         Assessment:  Encouraged patient to practice distraction/urge suppression techniques and do pelvic floor deep squat stretch multiple times a day    Plan:  TPDN?    Current Problem  1. Chronic idiopathic constipation        2. Pelvic floor dysfunction        3. Urge incontinence of urine                          Subjective   Better week than last week. +Urge incontinence but smaller amount  Objective   No gait antalgia    Treatments:  Neuromuscular re-education using internal sensor placed in vagina, patient supine, knees supported.  Resting tone =   4.7     microvolts (uv) after 60s    Glides to adductors  Abdomen to decrease tension

## 2024-09-23 ENCOUNTER — APPOINTMENT (OUTPATIENT)
Dept: UROLOGY | Facility: CLINIC | Age: 34
End: 2024-09-23
Payer: COMMERCIAL

## 2024-09-23 DIAGNOSIS — M62.89 PELVIC FLOOR DYSFUNCTION: ICD-10-CM

## 2024-09-23 DIAGNOSIS — R10.2 PELVIC PAIN: ICD-10-CM

## 2024-09-23 DIAGNOSIS — N32.81 OAB (OVERACTIVE BLADDER): ICD-10-CM

## 2024-09-23 DIAGNOSIS — N39.3 SUI (STRESS URINARY INCONTINENCE, FEMALE): Primary | ICD-10-CM

## 2024-09-23 PROCEDURE — 99214 OFFICE O/P EST MOD 30 MIN: CPT | Performed by: STUDENT IN AN ORGANIZED HEALTH CARE EDUCATION/TRAINING PROGRAM

## 2024-09-23 PROCEDURE — G2211 COMPLEX E/M VISIT ADD ON: HCPCS | Performed by: STUDENT IN AN ORGANIZED HEALTH CARE EDUCATION/TRAINING PROGRAM

## 2024-09-23 RX ORDER — CEFAZOLIN SODIUM 2 G/100ML
2 INJECTION, SOLUTION INTRAVENOUS ONCE
OUTPATIENT
Start: 2024-09-23 | End: 2024-09-23

## 2024-09-23 NOTE — PROGRESS NOTES
HISTORY OF PRESENT ILLNESS:  Sanjuana Lawson is a 33 y.o. female who presents today for a follow up visit. She is status post cystoscopy with pelvic floor botox, 200 units, on 7/15/24. She is still having symptoms of incontinence. She is interested in trying bladder botox.          Past Medical History  She has a past medical history of History of chickenpox, History of vaccination against human papillomavirus, Narcolepsy ([facility]), OAB (overactive bladder), Other conditions influencing health status, Pelvic floor dysfunction, Personal history of other infectious and parasitic diseases, S/P Botox injection (09/20/2018), and Sleep apnea.    Surgical History  She has no past surgical history on file.     Social History  She reports that she has never smoked. She has never used smokeless tobacco. She reports current alcohol use of about 1.0 standard drink of alcohol per week. She reports that she does not use drugs.    Family History  Family History   Problem Relation Name Age of Onset    No Known Problems Mother      No Known Problems Father      Lung cancer Father's Brother          unspecified laterally, unspecified part of lung    Breast cancer Maternal Grandmother          unspecified site of breast, unspecified laterally    Other (cva) Maternal Grandfather          due to embolism of precebral artery    Breast cancer Paternal Grandfather          unspecified laterally, unspecified site of breast    Cancer Other multiple family members     Hypertension Other multiple family members         Allergies  Patient has no known allergies.      A comprehensive 10+ review of systems was negative except for: see hpi                          PHYSICAL EXAMINATION:  BP Readings from Last 3 Encounters:   07/15/24 116/76   06/30/24 114/60   04/29/24 109/62      Wt Readings from Last 3 Encounters:   07/15/24 71.9 kg (158 lb 8.2 oz)   06/30/24 73.5 kg (162 lb 0.6 oz)   04/29/24 74.8 kg (165 lb)      BMI: Estimated body mass index is  "23.41 kg/m² as calculated from the following:    Height as of 7/15/24: 1.753 m (5' 9\").    Weight as of 7/15/24: 71.9 kg (158 lb 8.2 oz).  BSA: Estimated body surface area is 1.87 meters squared as calculated from the following:    Height as of 7/15/24: 1.753 m (5' 9\").    Weight as of 7/15/24: 71.9 kg (158 lb 8.2 oz).  HEENT: Normocephalic, atraumatic, PER EOMI, nonicteric, trachea normal, thyroid normal, oropharynx normal.  CARDIAC: regular rate & rhythm, S1 & S2 normal.  No heaves, thrills, gallops or murmurs.  LUNGS: Clear to auscultation, no spinal or CV tenderness.  EXTREMITIES: No evidence of cyanosis, clubbing or edema.               Assessment:  Sanjuana Lawson is a 33 y.o. who presents with treatment refractory urinary urge incontinence     UUI:  Has tried virtually every therapy including neuromodulation with sacral implant and PTNS, currently has the InterStim in place, Botox, all 7 medications,     Cystoscopy normal     Recent urodynamics demonstrates detrusor overactivity     She does complain of some stress incontinence and this may indicate that she may have dysfunctional voiding     Continue pelvic floor therapy with Leda     Currently taking vaginal Valium to see if this improves her symptoms if it does we can consider pelvic floor Botox, I will tentatively schedule her for this in June     Flomax worsened symptoms, discussed bladder sling but rather start with botox we will start with pelvic floor Botox     We discussed potentially performing a bladder sling versus  urethral bulking        S/P pelvic floor botox, 200 units, 7/15/24, some improvement     Will try botox, 200 units, again may help now that pelvic floor improved      VIVIAN:  We discussed the sling procedure in depth with her there is a long-term success rate of 70-80% complete continence and up to 90% significant improvement up to 10 years after surgery, though the sling is meant to last a lifetime, there is a <5% risk of subsequent " surgery, either revision or excision within 9 years. The major complications include bladder perforation with sling placement <1%, retention requiring sling lysis 1-3%, transient retention requiring 2-3 days of catheter drainage 33%, and mesh erosion 1-3%.     Bulkamid is associated with slightly less success rate of a sling about 60 to 70% of women having >90% improvement. However, there seems to be similar long-term success compared to sling with fewer side-effects. Main AE is urinary retention which resolves within 24 hours of using a 10-12 Yoruba catheter.  I discussed that if she still has some leakage after her procedure, she could perform another injection within 4 weeks and this procedure being performed in the office.       Will tentatively schedule for a sling        Adrenal Insufficiency:   No new complaints  Schedule with endocrinology       Follow up for botox, 200 units        All questions and concerns were answered and addressed.  The patient expressed understanding and agrees with the plan.     Hernandez Woods MD    Scribe Attestation  By signing my name below, IJanina Scribrui   attest that this documentation has been prepared under the direction and in the presence of Hernandez Woods MD.

## 2024-09-25 ENCOUNTER — TREATMENT (OUTPATIENT)
Dept: PHYSICAL THERAPY | Facility: CLINIC | Age: 34
End: 2024-09-25
Payer: COMMERCIAL

## 2024-09-25 DIAGNOSIS — K59.04 CHRONIC IDIOPATHIC CONSTIPATION: ICD-10-CM

## 2024-09-25 DIAGNOSIS — M62.89 PELVIC FLOOR DYSFUNCTION: ICD-10-CM

## 2024-09-25 DIAGNOSIS — R35.0 URINARY FREQUENCY: Primary | ICD-10-CM

## 2024-09-25 DIAGNOSIS — N39.41 URGE INCONTINENCE OF URINE: ICD-10-CM

## 2024-09-25 PROCEDURE — 97140 MANUAL THERAPY 1/> REGIONS: CPT | Mod: GP | Performed by: PHYSICAL THERAPIST

## 2024-09-25 PROCEDURE — 97112 NEUROMUSCULAR REEDUCATION: CPT | Mod: GP | Performed by: PHYSICAL THERAPIST

## 2024-09-25 NOTE — PROGRESS NOTES
Physical Therapy Treatment    Patient Name: Sanjuana Lawson  MRN: [id number]  Today's Date: 9/25/2024  Time Calculation  Start Time: 1107  Stop Time: 1205  Time Calculation (min): 58 min      Assessment:  Encouraged patient to practice distraction/urge suppression techniques and do pelvic floor deep squat stretch multiple times a day    Plan:  TPDN?    Current Problem  1. Urinary frequency        2. Chronic idiopathic constipation        3. Urge incontinence of urine        4. Pelvic floor dysfunction                            Subjective   Better week than last week. +Urge incontinence but smaller amount  Objective   No gait antalgia    Treatments:  Neuromuscular re-education using internal sensor placed in vagina, patient supine, knees supported.  Resting tone =   4.7     microvolts (uv) after 60s  12.5hz 5on/5off to inhibit muscle tone x 15 minutes x 24 uv/ma  3.6    Glides to adductors  Abdomen to decrease tension

## 2024-10-17 ENCOUNTER — TREATMENT (OUTPATIENT)
Dept: PHYSICAL THERAPY | Facility: CLINIC | Age: 34
End: 2024-10-17
Payer: COMMERCIAL

## 2024-10-17 DIAGNOSIS — M62.89 PELVIC FLOOR DYSFUNCTION: Primary | ICD-10-CM

## 2024-10-17 DIAGNOSIS — R35.0 URINARY FREQUENCY: ICD-10-CM

## 2024-10-17 PROCEDURE — 97140 MANUAL THERAPY 1/> REGIONS: CPT | Mod: GP | Performed by: PHYSICAL THERAPIST

## 2024-10-17 NOTE — PROGRESS NOTES
Physical Therapy Treatment    Patient Name: Sanjuana Lawson  MRN: [id number]  Today's Date: 10/17/2024         Assessment:  + response to Dn    Plan:  Discharge from this POC    Current Problem  1. Pelvic floor dysfunction        2. Urinary frequency                              Subjective   Better week than last week. +Urge incontinence but smaller amount  Objective   No gait antalgia    Treatments:  Patient supine, knees supported  Patient was educated on risks and benefits associated with dry needling, what to expect, and post-procedure protocol (i.e. increased water intake, increased stretching, etc.).  Patient educated on signs and symptoms associated with pneumothorax and to go to ED if patient were to start experiencing these after being needled in thorax area.  Patient was also educated on modifications to HEP.  Verbal consent was received to proceed with treatment.    .33x75 mm to eo/io/tra curly   .33x50 mm to rectus insertion on pubic symphysis  Estim 10 hz x15 min    Patient prone  .33x75 mm to S2 curly and L2 curly   Estim 10hz x15 minutes

## 2024-11-13 ENCOUNTER — TELEPHONE (OUTPATIENT)
Dept: UROLOGY | Facility: CLINIC | Age: 34
End: 2024-11-13
Payer: COMMERCIAL

## 2024-11-13 NOTE — TELEPHONE ENCOUNTER
Left message informing patient that we had to reschedule her Botox appt due to lack of approval from her insurance company. Informed patient that her appt was moved to 12/12/24 at 11:00am. Asked her to call if that day and time doesn't work for her.

## 2024-11-14 ENCOUNTER — APPOINTMENT (OUTPATIENT)
Dept: UROLOGY | Facility: CLINIC | Age: 34
End: 2024-11-14
Payer: COMMERCIAL

## 2024-11-14 ENCOUNTER — PROCEDURE VISIT (OUTPATIENT)
Dept: UROLOGY | Facility: CLINIC | Age: 34
End: 2024-11-14
Payer: COMMERCIAL

## 2024-11-14 DIAGNOSIS — N32.81 OAB (OVERACTIVE BLADDER): Primary | ICD-10-CM

## 2024-11-14 LAB
POC APPEARANCE, URINE: CLEAR
POC BILIRUBIN, URINE: NEGATIVE
POC BLOOD, URINE: NEGATIVE
POC COLOR, URINE: YELLOW
POC GLUCOSE, URINE: NEGATIVE MG/DL
POC KETONES, URINE: NEGATIVE MG/DL
POC LEUKOCYTES, URINE: NEGATIVE
POC NITRITE,URINE: NEGATIVE
POC PH, URINE: 7 PH
POC PROTEIN, URINE: NEGATIVE MG/DL
POC SPECIFIC GRAVITY, URINE: 1.02
POC UROBILINOGEN, URINE: 0.2 EU/DL

## 2024-11-14 RX ORDER — SODIUM BICARBONATE 42 MG/ML
5 INJECTION, SOLUTION INTRAVENOUS ONCE
Status: COMPLETED | OUTPATIENT
Start: 2024-11-14 | End: 2024-11-14

## 2024-11-14 RX ORDER — NITROFURANTOIN 25; 75 MG/1; MG/1
CAPSULE ORAL
Qty: 6 CAPSULE | Refills: 0 | Status: SHIPPED | OUTPATIENT
Start: 2024-11-14

## 2024-11-14 RX ORDER — LIDOCAINE HYDROCHLORIDE 10 MG/ML
50 INJECTION, SOLUTION INFILTRATION; PERINEURAL ONCE
Status: COMPLETED | OUTPATIENT
Start: 2024-11-14 | End: 2024-11-14

## 2024-11-14 NOTE — PROGRESS NOTES
"HISTORY OF PRESENT ILLNESS:  Sanjuana Lawson is a 34 y.o. female who presents today for a botox injection.          Past Medical History  She has a past medical history of History of chickenpox, History of vaccination against human papillomavirus, Narcolepsy ([facility]), OAB (overactive bladder), Other conditions influencing health status, Pelvic floor dysfunction, Personal history of other infectious and parasitic diseases, S/P Botox injection (09/20/2018), and Sleep apnea.    Surgical History  She has no past surgical history on file.     Social History  She reports that she has never smoked. She has never used smokeless tobacco. She reports current alcohol use of about 1.0 standard drink of alcohol per week. She reports that she does not use drugs.    Family History  Family History   Problem Relation Name Age of Onset    No Known Problems Mother      No Known Problems Father      Lung cancer Father's Brother          unspecified laterally, unspecified part of lung    Breast cancer Maternal Grandmother          unspecified site of breast, unspecified laterally    Other (cva) Maternal Grandfather          due to embolism of precebral artery    Breast cancer Paternal Grandfather          unspecified laterally, unspecified site of breast    Cancer Other multiple family members     Hypertension Other multiple family members         Allergies  Patient has no known allergies.      A comprehensive 10+ review of systems was negative except for: see hpi                          PHYSICAL EXAMINATION:  BP Readings from Last 3 Encounters:   08/26/24 125/65   07/15/24 116/76   06/30/24 114/60      Wt Readings from Last 3 Encounters:   08/26/24 70.8 kg (156 lb 1.4 oz)   07/15/24 71.9 kg (158 lb 8.2 oz)   06/30/24 73.5 kg (162 lb 0.6 oz)      BMI:   Estimated body mass index is 23.05 kg/m² as calculated from the following:    Height as of 7/15/24: 1.753 m (5' 9\").    Weight as of 8/26/24: 70.8 kg (156 lb 1.4 oz).  BSA:   Estimated " "body surface area is 1.86 meters squared as calculated from the following:    Height as of 7/15/24: 1.753 m (5' 9\").    Weight as of 8/26/24: 70.8 kg (156 lb 1.4 oz).  HEENT: Normocephalic, atraumatic, PER EOMI, nonicteric, trachea normal, thyroid normal, oropharynx normal.  CARDIAC: regular rate & rhythm, S1 & S2 normal.  No heaves, thrills, gallops or murmurs.  LUNGS: Clear to auscultation, no spinal or CV tenderness.  EXTREMITIES: No evidence of cyanosis, clubbing or edema.       Botox Injection    Sanjuana came today for Botox injection.  I reviewed with her the risks and benefits including ptosis, infection, and bleeding. She has no contraindications. She is on no antibiotics and has no neuromuscular disorders.  Pregnancy is not an issue.     She tolerated the procedure well.  The patient  will return to see me again in three to four months, earlier if there are any problems.     Sanjuana understands the side effects and risks, as well as the necessity for continued treatment to maintain improvement.  Additional therapy may be necessary. Call if there are any problems. See diagram for injection sites and dosages. 200 units were used at a concentration of 10 units per 0.1 mL.         Assessment:  Sanjuana Lawson is a 33 y.o. who presents with treatment refractory urinary urge incontinence     UUI:  -Has tried virtually every therapy including neuromodulation with sacral implant and PTNS, currently has the InterStim in place, Botox, all 7 medications,   -Cystoscopy normal   -Recent urodynamics demonstrates detrusor overactivity   -She does complain of some stress incontinence and this may indicate that she may have dysfunctional voiding   -Continue pelvic floor therapy with Leda   -Currently taking vaginal Valium to see if this improves her symptoms if it does we can consider pelvic floor Botox, I will tentatively schedule her for this in June   -Flomax worsened symptoms, discussed bladder sling but rather start " with botox we will start with pelvic floor Botox   -We discussed potentially performing a bladder sling versus  urethral bulking   -S/P pelvic floor botox, 200 units, 7/15/24, >90% improvement     200 units bladder botox 11/14/24, used samples     -Rx abx      VIVIAN:  -Will tentatively schedule for a sling        Adrenal Insufficiency:   -No new complaints  -Schedule with endocrinology       Follow up in 4 to 6 weeks       All questions and concerns were answered and addressed.  The patient expressed understanding and agrees with the plan.     Hernandez Woods MD    Scribe Attestation  By signing my name below, I, Janina Maldonado, Scribrui   attest that this documentation has been prepared under the direction and in the presence of Hernandez Woods MD.

## 2024-12-09 ENCOUNTER — APPOINTMENT (OUTPATIENT)
Dept: UROLOGY | Facility: CLINIC | Age: 34
End: 2024-12-09
Payer: COMMERCIAL

## 2024-12-09 DIAGNOSIS — N32.81 OAB (OVERACTIVE BLADDER): Primary | ICD-10-CM

## 2024-12-09 DIAGNOSIS — N39.46 MIXED INCONTINENCE: ICD-10-CM

## 2024-12-09 DIAGNOSIS — N39.3 SUI (STRESS URINARY INCONTINENCE, FEMALE): ICD-10-CM

## 2024-12-09 PROCEDURE — 99214 OFFICE O/P EST MOD 30 MIN: CPT | Performed by: STUDENT IN AN ORGANIZED HEALTH CARE EDUCATION/TRAINING PROGRAM

## 2024-12-09 NOTE — PROGRESS NOTES
HISTORY OF PRESENT ILLNESS:  Sanjuana Lawson is a 34 y.o. female who presents today for a follow up visit. She has had significant improvement with Botox, she reports she is not leaking anymore and only having rare urgency.         Past Medical History  She has a past medical history of History of chickenpox, History of vaccination against human papillomavirus, Narcolepsy ([facility]-HCC), OAB (overactive bladder), Other conditions influencing health status, Pelvic floor dysfunction, Personal history of other infectious and parasitic diseases, S/P Botox injection (09/20/2018), and Sleep apnea.    Surgical History  She has no past surgical history on file.     Social History  She reports that she has never smoked. She has never used smokeless tobacco. She reports current alcohol use of about 1.0 standard drink of alcohol per week. She reports that she does not use drugs.    Family History  Family History   Problem Relation Name Age of Onset    No Known Problems Mother      No Known Problems Father      Lung cancer Father's Brother          unspecified laterally, unspecified part of lung    Breast cancer Maternal Grandmother          unspecified site of breast, unspecified laterally    Other (cva) Maternal Grandfather          due to embolism of precebral artery    Breast cancer Paternal Grandfather          unspecified laterally, unspecified site of breast    Cancer Other multiple family members     Hypertension Other multiple family members         Allergies  Patient has no known allergies.      A comprehensive 10+ review of systems was negative except for: see hpi                          PHYSICAL EXAMINATION:  BP Readings from Last 3 Encounters:   08/26/24 125/65   07/15/24 116/76   06/30/24 114/60      Wt Readings from Last 3 Encounters:   08/26/24 70.8 kg (156 lb 1.4 oz)   07/15/24 71.9 kg (158 lb 8.2 oz)   06/30/24 73.5 kg (162 lb 0.6 oz)      BMI: Estimated body mass index is 23.05 kg/m² as calculated from the  "following:    Height as of 7/15/24: 1.753 m (5' 9\").    Weight as of 8/26/24: 70.8 kg (156 lb 1.4 oz).  BSA: Estimated body surface area is 1.86 meters squared as calculated from the following:    Height as of 7/15/24: 1.753 m (5' 9\").    Weight as of 8/26/24: 70.8 kg (156 lb 1.4 oz).  HEENT: Normocephalic, atraumatic, PER EOMI, nonicteric, trachea normal, thyroid normal, oropharynx normal.  CARDIAC: regular rate & rhythm, S1 & S2 normal.  No heaves, thrills, gallops or murmurs.  LUNGS: Clear to auscultation, no spinal or CV tenderness.  EXTREMITIES: No evidence of cyanosis, clubbing or edema.               Assessment:  Sanjuana Lawson is a 34 y.o. who presents with treatment refractory urinary urge incontinence     UUI:  -Has tried virtually every therapy including neuromodulation with sacral implant and PTNS, currently has the InterStim in place, Botox, all 7 medications,   -Cystoscopy normal   -Recent urodynamics demonstrates detrusor overactivity   -She does complain of some stress incontinence and this may indicate that she may have dysfunctional voiding   -Continue pelvic floor therapy with Leda   -Currently taking vaginal Valium to see if this improves her symptoms if it does we can consider pelvic floor Botox, I will tentatively schedule her for this in June   -Flomax worsened symptoms, discussed bladder sling but rather start with botox we will start with pelvic floor Botox   -We discussed potentially performing a bladder sling versus  urethral bulking   -S/P pelvic floor botox, 200 units, 7/15/24, >90% improvement   -200 units bladder botox 11/14/24, used samples, significant improvmeent         VIVIAN:  Will see if improvement persists if it does we will cancel the sling there  -tentatively scheduled for a sling         Adrenal Insufficiency:   -No new complaints  -Schedule with endocrinology       Follow up in January        All questions and concerns were answered and addressed.  The patient expressed " understanding and agrees with the plan.     Hernandez Woods MD    Scribe Attestation  By signing my name below, I, Janina Maldonado, Scribrui   attest that this documentation has been prepared under the direction and in the presence of Henrandez Woods MD.

## 2024-12-12 ENCOUNTER — APPOINTMENT (OUTPATIENT)
Dept: UROLOGY | Facility: CLINIC | Age: 34
End: 2024-12-12
Payer: COMMERCIAL

## 2025-01-30 ENCOUNTER — APPOINTMENT (OUTPATIENT)
Dept: UROLOGY | Facility: CLINIC | Age: 35
End: 2025-01-30
Payer: COMMERCIAL

## 2025-01-30 DIAGNOSIS — N32.81 OAB (OVERACTIVE BLADDER): ICD-10-CM

## 2025-01-30 DIAGNOSIS — N39.3 SUI (STRESS URINARY INCONTINENCE, FEMALE): Primary | ICD-10-CM

## 2025-01-30 PROCEDURE — G2211 COMPLEX E/M VISIT ADD ON: HCPCS | Performed by: STUDENT IN AN ORGANIZED HEALTH CARE EDUCATION/TRAINING PROGRAM

## 2025-01-30 PROCEDURE — 99214 OFFICE O/P EST MOD 30 MIN: CPT | Performed by: STUDENT IN AN ORGANIZED HEALTH CARE EDUCATION/TRAINING PROGRAM

## 2025-01-30 NOTE — PROGRESS NOTES
HISTORY OF PRESENT ILLNESS:  Sanjuana Lawson is a 34 y.o. female who presents today for a follow up visit. She is doing better with her incontinence, but does still have some stress incontinence. She is not sure why she does or what to do now and asks what her options are.  She is not sure if she still wants the sling yet.          Past Medical History  She has a past medical history of History of chickenpox, History of vaccination against human papillomavirus, Narcolepsy ([facility]-HCC), OAB (overactive bladder), Other conditions influencing health status, Pelvic floor dysfunction, Personal history of other infectious and parasitic diseases, S/P Botox injection (09/20/2018), and Sleep apnea.    Surgical History  She has no past surgical history on file.     Social History  She reports that she has never smoked. She has never used smokeless tobacco. She reports current alcohol use of about 1.0 standard drink of alcohol per week. She reports that she does not use drugs.    Family History  Family History   Problem Relation Name Age of Onset    No Known Problems Mother      No Known Problems Father      Lung cancer Father's Brother          unspecified laterally, unspecified part of lung    Breast cancer Maternal Grandmother          unspecified site of breast, unspecified laterally    Other (cva) Maternal Grandfather          due to embolism of precebral artery    Breast cancer Paternal Grandfather          unspecified laterally, unspecified site of breast    Cancer Other multiple family members     Hypertension Other multiple family members         Allergies  Patient has no known allergies.      A comprehensive 10+ review of systems was negative except for: see hpi                          PHYSICAL EXAMINATION:  BP Readings from Last 3 Encounters:   08/26/24 125/65   07/15/24 116/76   06/30/24 114/60      Wt Readings from Last 3 Encounters:   08/26/24 70.8 kg (156 lb 1.4 oz)   07/15/24 71.9 kg (158 lb 8.2 oz)   06/30/24 73.5  "kg (162 lb 0.6 oz)      BMI: Estimated body mass index is 23.05 kg/m² as calculated from the following:    Height as of 7/15/24: 1.753 m (5' 9\").    Weight as of 8/26/24: 70.8 kg (156 lb 1.4 oz).  BSA: Estimated body surface area is 1.86 meters squared as calculated from the following:    Height as of 7/15/24: 1.753 m (5' 9\").    Weight as of 8/26/24: 70.8 kg (156 lb 1.4 oz).  HEENT: Normocephalic, atraumatic, PER EOMI, nonicteric, trachea normal, thyroid normal, oropharynx normal.  CARDIAC: regular rate & rhythm, S1 & S2 normal.  No heaves, thrills, gallops or murmurs.  LUNGS: Clear to auscultation, no spinal or CV tenderness.  EXTREMITIES: No evidence of cyanosis, clubbing or edema.               Assessment:  Sanjuana Lawson is a 34 y.o. who presents with treatment refractory urinary urge incontinence     UUI:  -Has tried virtually every therapy including neuromodulation with sacral implant and PTNS, currently has the InterStim in place, Botox, all 7 medications,   -Cystoscopy normal   -Recent urodynamics demonstrates detrusor overactivity   -She does complain of some stress incontinence and this may indicate that she may have dysfunctional voiding   -Continue pelvic floor therapy with Leda   -Currently taking vaginal Valium to see if this improves her symptoms if it does we can consider pelvic floor Botox, I will tentatively schedule her for this in June   -Flomax worsened symptoms, discussed bladder sling but rather start with botox we will start with pelvic floor Botox   -We discussed potentially performing a bladder sling versus urethral bulking   -S/P pelvic floor botox, 200 units, 7/15/24, >90% improvement   -200 units bladder botox 11/14/24, used samples, significant improvmeent        Plan on SNM removal      VIVIAN:  Improved markedly    Can switch to bulking or just expectant mgmt, she will let me know         Adrenal Insufficiency:   -No new complaints  -Schedule with endocrinology       Follow up post op "       All questions and concerns were answered and addressed.  The patient expressed understanding and agrees with the plan.     Hernandez Woods MD    Scribe Attestation  By signing my name below, I, Pranav Acevesibrui   attest that this documentation has been prepared under the direction and in the presence of Hernandez Woods MD.

## 2025-02-18 ENCOUNTER — PRE-ADMISSION TESTING (OUTPATIENT)
Dept: PREADMISSION TESTING | Facility: HOSPITAL | Age: 35
End: 2025-02-18
Payer: COMMERCIAL

## 2025-02-18 VITALS
WEIGHT: 162.7 LBS | BODY MASS INDEX: 24.1 KG/M2 | OXYGEN SATURATION: 98 % | TEMPERATURE: 97 F | HEART RATE: 63 BPM | RESPIRATION RATE: 16 BRPM | HEIGHT: 69 IN

## 2025-02-18 DIAGNOSIS — R10.2 PELVIC PAIN: ICD-10-CM

## 2025-02-18 DIAGNOSIS — M62.89 PELVIC FLOOR DYSFUNCTION: ICD-10-CM

## 2025-02-18 DIAGNOSIS — N32.81 OAB (OVERACTIVE BLADDER): ICD-10-CM

## 2025-02-18 DIAGNOSIS — Z01.818 PRE-OPERATIVE EXAM: Primary | ICD-10-CM

## 2025-02-18 DIAGNOSIS — N39.3 SUI (STRESS URINARY INCONTINENCE, FEMALE): ICD-10-CM

## 2025-02-18 LAB
ANION GAP SERPL CALC-SCNC: NORMAL MMOL/L
BASOPHILS # BLD AUTO: NORMAL 10*3/UL
BASOPHILS NFR BLD AUTO: NORMAL %
BUN SERPL-MCNC: NORMAL MG/DL
CALCIUM SERPL-MCNC: NORMAL MG/DL
CHLORIDE SERPL-SCNC: NORMAL MMOL/L
CO2 SERPL-SCNC: NORMAL MMOL/L
CREAT SERPL-MCNC: NORMAL MG/DL
EGFRCR SERPLBLD CKD-EPI 2021: NORMAL ML/MIN/{1.73_M2}
EOSINOPHIL # BLD AUTO: NORMAL 10*3/UL
EOSINOPHIL NFR BLD AUTO: NORMAL %
ERYTHROCYTE [DISTWIDTH] IN BLOOD BY AUTOMATED COUNT: NORMAL %
GLUCOSE SERPL-MCNC: NORMAL MG/DL
HCT VFR BLD AUTO: NORMAL %
HGB BLD-MCNC: NORMAL G/DL
IMM GRANULOCYTES # BLD AUTO: NORMAL 10*3/UL
IMM GRANULOCYTES NFR BLD AUTO: 0.2 % (ref 0–0.9)
LYMPHOCYTES # BLD AUTO: NORMAL 10*3/UL
LYMPHOCYTES NFR BLD AUTO: NORMAL %
MCH RBC QN AUTO: NORMAL PG
MCHC RBC AUTO-ENTMCNC: NORMAL G/DL
MCV RBC AUTO: NORMAL FL
MONOCYTES # BLD AUTO: NORMAL 10*3/UL
MONOCYTES NFR BLD AUTO: NORMAL %
NEUTROPHILS # BLD AUTO: NORMAL 10*3/UL
NEUTROPHILS NFR BLD AUTO: 46.6 %
NRBC BLD-RTO: NORMAL /100{WBCS}
PLATELET # BLD AUTO: NORMAL 10*3/UL
POTASSIUM SERPL-SCNC: NORMAL MMOL/L
RBC # BLD AUTO: NORMAL 10*6/UL
SODIUM SERPL-SCNC: NORMAL MMOL/L
WBC # BLD AUTO: NORMAL 10*3/UL

## 2025-02-18 PROCEDURE — 99204 OFFICE O/P NEW MOD 45 MIN: CPT | Performed by: REGISTERED NURSE

## 2025-02-18 PROCEDURE — 85025 COMPLETE CBC W/AUTO DIFF WBC: CPT

## 2025-02-18 PROCEDURE — 36415 COLL VENOUS BLD VENIPUNCTURE: CPT

## 2025-02-18 PROCEDURE — 80048 BASIC METABOLIC PNL TOTAL CA: CPT

## 2025-02-18 ASSESSMENT — DUKE ACTIVITY SCORE INDEX (DASI)
TOTAL_SCORE: 58.2
CAN YOU DO YARD WORK LIKE RAKING LEAVES, WEEDING OR PUSHING A MOWER: YES
CAN YOU WALK INDOORS, SUCH AS AROUND YOUR HOUSE: YES
CAN YOU TAKE CARE OF YOURSELF (EAT, DRESS, BATHE, OR USE TOILET): YES
CAN YOU CLIMB A FLIGHT OF STAIRS OR WALK UP A HILL: YES
CAN YOU PARTICIPATE IN STRENOUS SPORTS LIKE SWIMMING, SINGLES TENNIS, FOOTBALL, BASKETBALL, OR SKIING: YES
CAN YOU HAVE SEXUAL RELATIONS: YES
DASI METS SCORE: 9.9
CAN YOU PARTICIPATE IN MODERATE RECREATIONAL ACTIVITIES LIKE GOLF, BOWLING, DANCING, DOUBLES TENNIS OR THROWING A BASEBALL OR FOOTBALL: YES
CAN YOU WALK A BLOCK OR TWO ON LEVEL GROUND: YES
CAN YOU DO LIGHT WORK AROUND THE HOUSE LIKE DUSTING OR WASHING DISHES: YES
CAN YOU DO MODERATE WORK AROUND THE HOUSE LIKE VACUUMING, SWEEPING FLOORS OR CARRYING GROCERIES: YES
CAN YOU DO HEAVY WORK AROUND THE HOUSE LIKE SCRUBBING FLOORS OR LIFTING AND MOVING HEAVY FURNITURE: YES
CAN YOU RUN A SHORT DISTANCE: YES

## 2025-02-18 ASSESSMENT — ENCOUNTER SYMPTOMS
NECK NEGATIVE: 1
CARDIOVASCULAR NEGATIVE: 1
NEUROLOGICAL NEGATIVE: 1
RESPIRATORY NEGATIVE: 1
MUSCULOSKELETAL NEGATIVE: 1
CONSTITUTIONAL NEGATIVE: 1
EYES NEGATIVE: 1
GASTROINTESTINAL NEGATIVE: 1

## 2025-02-18 ASSESSMENT — ACTIVITIES OF DAILY LIVING (ADL): ADL_SCORE: 0

## 2025-02-18 ASSESSMENT — PAIN - FUNCTIONAL ASSESSMENT: PAIN_FUNCTIONAL_ASSESSMENT: 0-10

## 2025-02-18 ASSESSMENT — LIFESTYLE VARIABLES: SMOKING_STATUS: NONSMOKER

## 2025-02-18 NOTE — CPM/PAT H&P
CPM/PAT Evaluation       Name: Sanjuana Lawson (Sanjuana Lawson)  /Age:  y.o.     In-Person       Chief Complaint: Evaluation prior to surgery    HPI    Past Medical History:   Diagnosis Date    History of chickenpox     History of vaccination against human papillomavirus     Hypothyroidism     Narcolepsy     OAB (overactive bladder)     Other conditions influencing health status     Uncomplicated asthma, unspecified asthma severity    Pelvic floor dysfunction     Personal history of other infectious and parasitic diseases     S/P Botox injection 2018    Sleep apnea     not diagnosed officially       Past Surgical History:   Procedure Laterality Date    BLADDER SURGERY      complete interstem , battery replaced, botox injection       Patient  has no history on file for sexual activity.    Family History   Problem Relation Name Age of Onset    No Known Problems Mother      No Known Problems Father      Lung cancer Father's Brother          unspecified laterally, unspecified part of lung    Breast cancer Maternal Grandmother          unspecified site of breast, unspecified laterally    Other (cva) Maternal Grandfather          due to embolism of precebral artery    Breast cancer Paternal Grandfather          unspecified laterally, unspecified site of breast    Cancer Other multiple family members     Hypertension Other multiple family members        No Known Allergies    Prior to Admission medications    Medication Sig Start Date End Date Taking? Authorizing Provider   albuterol 90 mcg/actuation inhaler 2 puffs Inhaler every 4 hours as needed    Historical Provider, MD   cholecalciferol (Vitamin D-3) 50 MCG ( UT) tablet Vitamin D TABS   Refills: 0       Active    Historical Provider, MD   diazePAM (Valium) 5 mg tablet Place 1 pill in vagina q8hrs prn for pelvic pain 24   Hernandez Woods MD   docosahexaenoic acid/epa (FISH OIL ORAL) orally once a day    Historical Provider, MD   glucos sul  2KCl/msm/chond/C/Mn (GLUCOSAMINE CHONDROITIN ORAL) with MSM    Historical Provider, MD   GLUTATHIONE ORAL as directed    Historical Provider, MD   levothyroxine (Synthroid) 75 mcg tablet take 1 tablet daily in the morning on an empty stomach paola 1 Orally Once a day for 90 days 8/11/21   Historical Provider, MD   multivit,calc,mins/iron/folic (WOMEN'S ONE DAILY ORAL) as directed Orally    Historical Provider, MD   multivitamin tablet Daily Multiple Vitamins Oral Tablet   Refills: 0        Start : 18-Jul-2016   Active 7/18/16   Historical Provider, MD   nitrofurantoin, macrocrystal-monohydrate, (Macrobid) 100 mg capsule Take 1 pill x2 per day for 3 days 11/14/24   Hernandez Woods MD   vitamin B complex (COMPLEX B-100 ORAL) as directed Orally    Historical Provider, MD BOWSER ROS:   Constitutional:   neg    Neuro/Psych:   neg    Eyes:   neg    Ears:   neg    Nose:   Mouth:   neg    Throat:   neg    Neck:   neg    Cardio:   neg    Respiratory:   neg    Endocrine:   GI:   neg    :    vaginal issues  Musculoskeletal:   neg    Hematologic:   neg    Skin:  neg        Physical Exam  Vitals reviewed.   Constitutional:       Appearance: Normal appearance.   HENT:      Head: Normocephalic and atraumatic.      Mouth/Throat:      Mouth: Mucous membranes are moist.      Pharynx: Oropharynx is clear.   Neck:      Vascular: No carotid bruit.   Cardiovascular:      Rate and Rhythm: Normal rate and regular rhythm.      Pulses: Normal pulses.      Heart sounds: Normal heart sounds.   Pulmonary:      Effort: Pulmonary effort is normal.      Breath sounds: Normal breath sounds.   Abdominal:      Palpations: Abdomen is soft.   Genitourinary:     Comments: Neurostim pain 5/10  Musculoskeletal:         General: Normal range of motion.      Cervical back: Normal range of motion and neck supple.   Skin:     General: Skin is warm and dry.      Capillary Refill: Capillary refill takes less than 2 seconds.   Neurological:      General: No  "focal deficit present.      Mental Status: She is alert and oriented to person, place, and time.   Psychiatric:         Mood and Affect: Mood normal.         Behavior: Behavior normal.         Thought Content: Thought content normal.         Judgment: Judgment normal.          PAT AIRWAY:   Airway:     Neck ROM::  Full  normal        Visit Vitals  Pulse 63   Temp 36.1 °C (97 °F) (Tympanic)   Resp 16   Ht 1.753 m (5' 9\")   Wt 73.8 kg (162 lb 11.2 oz)   SpO2 98%   BMI 24.03 kg/m²   OB Status Having periods   Smoking Status Never   BSA 1.9 m²       DASI Risk Score      Flowsheet Row Pre-Admission Testing from 2/18/2025 in [facility]   Can you take care of yourself (eat, dress, bathe, or use toilet)?  2.75 filed at 02/18/2025 1012   Can you walk indoors, such as around your house? 1.75 filed at 02/18/2025 1012   Can you walk a block or two on level ground?  2.75 filed at 02/18/2025 1012   Can you climb a flight of stairs or walk up a hill? 5.5 filed at 02/18/2025 1012   Can you run a short distance? 8 filed at 02/18/2025 1012   Can you do light work around the house like dusting or washing dishes? 2.7 filed at 02/18/2025 1012   Can you do moderate work around the house like vacuuming, sweeping floors or carrying groceries? 3.5 filed at 02/18/2025 1012   Can you do heavy work around the house like scrubbing floors or lifting and moving heavy furniture?  8 filed at 02/18/2025 1012   Can you do yard work like raking leaves, weeding or pushing a mower? 4.5 filed at 02/18/2025 1012   Can you have sexual relations? 5.25 filed at 02/18/2025 1012   Can you participate in moderate recreational activities like golf, bowling, dancing, doubles tennis or throwing a baseball or football? 6 filed at 02/18/2025 1012   Can you participate in strenous sports like swimming, singles tennis, football, basketball, or skiing? 7.5 filed at 02/18/2025 1012   DASI SCORE 58.2 filed at 02/18/2025 1012   METS Score (Will be calculated " only when all the questions are answered) 9.9 filed at 02/18/2025 1012          Caprini DVT Assessment      Flowsheet Row Pre-Admission Testing from 2/18/2025 in [facility]   DVT Score (IF A SCORE IS NOT CALCULATING, MUST SELECT A BMI TO COMPLETE) 3 filed at 02/18/2025 0901   Surgical Factors Major surgery planned, including arthroscopic and laproscopic (1-2 hours) filed at 02/18/2025 0901   BMI (BMI MUST BE CHOSEN) 30 or less filed at 02/18/2025 0901          Modified Frailty Index    No data to display       CHADS2 Stroke Risk  Current as of 43 minutes ago        N/A 3 to 100%: High Risk   2 to < 3%: Medium Risk   0 to < 2%: Low Risk     Last Change: N/A          This score determines the patient's risk of having a stroke if the patient has atrial fibrillation.        This score is not applicable to this patient. Components are not calculated.          Revised Cardiac Risk Index      Flowsheet Row Pre-Admission Testing from 2/18/2025 in [facility]   High-Risk Surgery (Intraperitoneal, Intrathoracic,Suprainguinal vascular) 0 filed at 02/18/2025 0902   History of ischemic heart disease (History of MI, History of positive exercuse test, Current chest paint considered due to myocardial ischemia, Use of nitrate therapy, ECG with pathological Q Waves) 0 filed at 02/18/2025 0902   History of congestive heart failure (pulmonary edemia, bilateral rales or S3 gallop, Paroxysmal nocturnal dyspnea, CXR showing pulmonary vascular redistribution) 0 filed at 02/18/2025 0902   History of cerebrovascular disease (Prior TIA or stroke) 0 filed at 02/18/2025 0902   Pre-operative insulin treatment 0 filed at 02/18/2025 0902   Pre-operative creatinine>2 mg/dl 0 filed at 02/18/2025 0902   Revised Cardiac Risk Calculator 0 filed at 02/18/2025 0902          Apfel Simplified Score      Flowsheet Row Pre-Admission Testing from 2/18/2025 in [facility]   Smoking status 1 filed at 02/18/2025 0902    History of motion sickness or PONV  0 filed at 2025 0902   Use of postoperative opioids 1 filed at 2025 0902   Gender - Female 1=Yes filed at 2025 0902   Apfel Simplified Score Calculator 3 filed at 2025 0902          Risk Analysis Index Results This Encounter         2025  1012             Do you live in a place other than your own home?: 0    When did you begin living in the place you are currently residing?: Greater than one year ago    Any kidney failure, kidney not working well, or seeing a kidney doctor (nephrologist)? If yes, was this for kidney stones or another problem?: 0 No    Any history of chronic (long-term) congestive heart failure (CHF)?: 0 No    Any shortness of breath when resting?: 0 No    In the past five years, have you been diagnosed with or treated for cancer?: No    During the last 3 months has it become difficult for you to remember things or organize your thoughts?: 0 No    Have you lost weight of 10 pounds or more in the past 3 months without trying?: 0 No    Do you have any loss of appetitie?: 0 No    Getting Around (Mobility): 0 Can get around without help    Eatin Can plan and prepare own meals    Toiletin Can use toilet without any help    Personal Hygiene (Bathing, Hand Washing, Changing Clothes): 0 Can shower or bathe without any help    CNITRON Cancer History: Patient does not indicate history of cancer    Total Risk Analysis Index Score Without Cancer: 6    Total Risk Analysis Index Score: 6          Stop Bang Score      Flowsheet Row Pre-Admission Testing from 2025 in [facility]   Do you snore loudly? 0 filed at 2025 1010   Do you often feel tired or fatigued after your sleep? 0 filed at 2025 1010   Has anyone ever observed you stop breathing in your sleep? 0 filed at 2025 1010   Do you have or are you being treated for high blood pressure? 0 filed at 2025 1010   Recent BMI (Calculated) 23.4 filed at  02/18/2025 1010   Is BMI greater than 35 kg/m2? 0=No filed at 02/18/2025 1010   Age older than 50 years old? 0=No filed at 02/18/2025 1010   Is your neck circumference greater than 17 inches (Male) or 16 inches (Female)? 0 filed at 02/18/2025 1010   Gender - Male 0=No filed at 02/18/2025 1010   STOP-BANG Total Score 0 filed at 02/18/2025 1010          Prodigy: High Risk  Total Score: 5              Prodigy SDB Score          ARISCAT Score for Postoperative Pulmonary Complications    No data to display       [place]real Perioperative Risk for Myocardial Infarction or Cardiac Arrest (JB)    No data to display         No results found for this or any previous visit (from the past 24 hours).     Assessment & Plan:    34 y.o.  female  scheduled for CYSTOSCOPY, WITH COLLAGEN INJECTION REMOVAL, NEUROSTIMULATOR, BLADDER  on 2*26/25 with Dr. Woods for  VIVIAN (stress urinary incontinence, female).  PMHX includes Pelvic floor dysfunction, narcolepsy, hypothyroidism, asthma.  PAT consulted for perioperative risk stratification and optimization    Neuro:  Hx narcolepsy  age, Patient instructed on and provided cognitive exercises  Patient is not at increased risk for perioperative CVA      HEENT:  No HEENT diagnosis or significant findings on chart review or clinical presentation and evaluation. No further preoperative testing/intervention indicated at this time.    Cardiovascular:  No CV diagnosis or significant findings on chart review or clinical presentation and evaluation. No further preoperative testing or intervention is indicated at this time.  METS: 9.9  RCRI: 0 points, 3.9%  risk for postoperative MACE   JB: 0.1% risk for 30 day postoperative MACE    Pulmonary:  Hx asthma, albuterol inhaler as needed    Stop Bang score is 0 placing patient at low risk for TC  ARISCAT: <26 points, 1.6% risk of in-hospital postoperative pulmonary complication  PRODIGY: Low risk for opioid induced respiratory depression    Pulmonary education  discussed. Patient provided deep breathing exercises and educational handout.        Urological/GYN/Renal  Per Sheyn visit 1/30/25  Urinary Urge Incontinence  Has tried virtually every therapy including neuromodulation with sacral implant and PTNS, currently has the InterStim in place, Botox, all 7 medications,   -Cystoscopy normal   -Recent urodynamics demonstrates detrusor overactivity   -She does complain of some stress incontinence    -Continue pelvic floor therapy   Plan on SNM removal, device has not worked, and has been off, she wants it removed    she reports that the IPG is uncomfortable, it hurts to wear certain clothes, and be in certain position and it interferes with her job as a  because of the position of her belt    -S/P pelvic floor botox, 200 units, 7/15/24, >90% improvement   -200 units bladder botox 11/14/24, used samples, significant improvmeent     VIVIAN:  Improved markedly    Endocrine:  No endocrine diagnosis or significant findings on chart review or clinical presentation and evaluation. No further testing or intervention is indicated at this time.    Hematologic:  Antiplatelet management   The patient is not currently receiving antiplatelet therapy.  Anticoagulation management  The patient is not currently receiving anticoagulation therapy. Patient provided with DVT educational handout.    Caprini Score 3, patient at high risk for perioperative DVT.  Patient provided with VTE education/handout.    Gastrointestinal:   No GI diagnosis or significant findings on chart review or clinical presentation and evaluation.   Eat-10 score 0      Infectious disease:   No infectious diagnosis or significant findings on chart review or clinical presentation and evaluation.       Musculoskeletal:   No diagnosis or significant findings on chart review or clinical presentation and evaluation.     Anesthesia/Airway:  No anesthesia complications      Medication instructions and NPO guidelines  reviewed with the patient.  All questions or concerns discussed and addressed.      Labs and EKG ordered   CBC  BMP

## 2025-02-18 NOTE — PREPROCEDURE INSTRUCTIONS
Medication List            Accurate as of February 18, 2025 10:20 AM. Always use your most recent med list.                albuterol 90 mcg/actuation inhaler  Medication Adjustments for Surgery: Take/Use as prescribed     cholecalciferol 50 MCG (2000 UT) tablet  Commonly known as: Vitamin D-3  Additional Medication Adjustments for Surgery: Take last dose 7 days before surgery     COMPLEX B-100 ORAL  Additional Medication Adjustments for Surgery: Take last dose 7 days before surgery     diazePAM 5 mg tablet  Commonly known as: Valium  Place 1 pill in vagina q8hrs prn for pelvic pain  Medication Adjustments for Surgery: Take/Use as prescribed     FISH OIL ORAL  Additional Medication Adjustments for Surgery: Take last dose 7 days before surgery     GLUCOSAMINE CHONDROITIN ORAL  Additional Medication Adjustments for Surgery: Take last dose 7 days before surgery     GLUTATHIONE ORAL  Additional Medication Adjustments for Surgery: Take last dose 7 days before surgery     multivitamin tablet  Additional Medication Adjustments for Surgery: Take last dose 7 days before surgery     nitrofurantoin (macrocrystal-monohydrate) 100 mg capsule  Commonly known as: Macrobid  Take 1 pill x2 per day for 3 days  Medication Adjustments for Surgery: Take/Use as prescribed     Synthroid 75 mcg tablet  Generic drug: levothyroxine  Medication Adjustments for Surgery: Take/Use as prescribed     WOMEN'S ONE DAILY ORAL  Additional Medication Adjustments for Surgery: Take last dose 7 days before surgery                                Thank you for visiting Preadmission Testing (PAT) today for your pre-procedure evaluation, you were seen by     ERAN Brown-CNP  Pre Admission Testing  [facility]  [phone]    This summary includes instructions and information to aid you during your perioperative period.  Please read carefully. If you have any questions about your visit today, please call the number listed above.  If  you become ill or have any changes to your health before your surgery, please contact your primary care provider and alert your surgeon.        Preparing for your Surgery       Exercises  Preoperative Deep Breathing Exercises  Why it is important to do deep breathing exercises before my surgery?  Deep breathing exercises strengthen your breathing muscles.  This helps you to recover after your surgery and decreases the chance of breathing complications.  How are the deep breathing exercises done?  Sit straight with your back supported.  Breathe in deeply and slowly through your nose. Your lower rib cage should expand and your abdomen may move forward.  Hold that breath for 3 to 5 seconds.  Breathe out through pursed lips, slowly and completely.  Rest and repeat 10 times every hour while awake.  Rest longer if you become dizzy or lightheaded.       Preoperative Brain Exercises    What are brain exercises?  A brain exercise is any activity that engages your thinking (cognitive) skills.    What types of activities are considered brain exercises?  Jigsaw puzzles, crossword puzzles, word jumble, memory games, word search, and many more.  Many can be found free online or on your phone via a mobile nato.    Why should I do brain exercises before my surgery?  More recent research has shown brain exercise before surgery can lower the risk of postoperative delirium (confusion) which can be especially important for older adults.  Patients who did brain exercises for 5 to 10 hours the days before surgery, cut their risk of postoperative delirium in half up to 1 week after surgery.    Sit-to-Stand Exercise    What is the sit-to-stand exercise?  The sit-to-stand exercise strengthens the muscles of your lower body and muscles in the center of your body (core muscles for stability) helping to maintain and improve your strength and mobility.  How do I do the sit-to-stand exercise?  The goal is to do this exercise without using your  arms or hands.  If this is too difficult, use your arms and hands or a chair with armrests to help slowly push yourself to the standing position and lower yourself back to the sitting position. As the movement becomes easier use your arms and hands less.    Steps to the sit-to-stand exercise  Sit up tall in a sturdy chair, knees bent, feet flat on the floor shoulder-width apart.  Shift your hips/pelvis forward in the chair to correctly position yourself for the next movement.  Lean forward at your hips.  Stand up straight putting equal weight on both feet.  Check to be sure you are properly aligned with the chair, in a slow controlled movement sit back down.  Repeat this exercise 10-15 times.  If needed you can do it fewer times until your strength improves.  Rest for 1 minute.  Do another 10-15 sit-to-stand exercises.  Try to do this in the morning and evening.        Instructions    Preoperative Fasting Guidelines    Why must I stop eating and drinking near surgery time?  With sedation, food or liquid in your stomach can enter your lungs causing serious complications  Food can increase nausea and vomiting  When do I need to stop eating and drinking before my surgery?      Do not eat any food after midnight the night before your surgery/procedure. You may have up to 13.5 ounces of clear liquid until TWO hours before your instructed arrival time to the hospital.  This includes water, black tea/coffee, (no milk or cream) apple juice, and electrolyte drinks (Gatorade). You may chew gum until TWO hours before your surgery/procedure            Simple things you can do to help prevent blood clots     Blood clots are blockages that can form in the body's veins. When a blood clot forms in your deep veins, it may be called a deep vein thrombosis, or DVT for short. Blood clots can happen in any part of the body where blood flows, but they are most common in the arms and legs. If a piece of a blood clot breaks free and  travels to the lungs, it is called a pulmonary embolus (PE). A PE can be a very serious problem.         Being in the hospital or having surgery can raise your chances of getting a blood clot because you may not be well enough to move around as much as you normally do.         Ways you can help prevent blood clots in the hospital       Wearing SCDs  SCDs stands for Sequential Compression Devices.   SCDs are special sleeves that wrap around your legs. They attach to a pump that fills them with air to gently squeeze your legs every few minutes.  This helps return the blood in your legs to your heart.   SCDs should only be taken off when walking or bathing. SCDs may not be comfortable, but they can help save your life.              Pump SCD leg sleeves  Wearing compression stockings - if your doctor orders them. These special snug-fitting stockings gently squeeze your legs to help blood flow.       Walking. Walking helps move the blood in your legs.   If your doctor says it is ok, try walking the halls at least   5 times a day. Ask us to help you get up, so you don't fall.      Taking any blood-thinning medicines your doctor orders.              Ways you can help prevent blood clots at home         Wearing compression stockings - if your doctor orders them.   Walking - to help move the blood in your legs.    Taking any blood-thinning medicines your doctor orders.      Signs of a blood clot or PE    Tell your doctor or nurse right away if you have any of the problems listed below.         If you are at home, seek medical care right away. Call 911 for chest pain or problems breathing.            Signs of a blood clot (DVT) - such as pain, swelling, redness, or warmth in your arm or legs.  Signs of a pulmonary embolism (PE) - such as chest pain or feeling short of breath      Tobacco and Alcohol;  Do not drink alcohol or smoke within 24 hours of surgery.  It is best to quit smoking for as long as possible before any  surgery or procedure.          The Week before Surgery        Seven days before Surgery  Check your PAT medication instructions  Do the exercises provided to you by PAT  Arrange for a responsible, adult licensed  to take you home after surgery and stay with you for 24 hours.  You will not be permitted to drive yourself home if you have received any anesthetic/sedation  Six days before surgery  Check your PAT medication instructions  Do the exercises provided to you by PAT  Start using Chlorhexidene (CHG) body wash if prescribed  Five days before surgery  Check your PAT medication instructions  Do the exercises provided to you by PAT  Continue to use CHG body wash if prescribed  Three days before surgery  Check your PAT medication instructions  Do the exercises provided to you by PAT  Continue to use CHG body wash if prescribed  Two days before surgery  Check your PAT medication instructions  Do the exercises provided to you by PAT  Continue to use CHG body wash if prescribed    The Day before Surgery       Check your PAT medication and all other PAT instructions including when to stop eating and drinking  You will be called with your arrival time for surgery in the late afternoon.  If you do not receive a call please reach out to [facility] Pre-Op. [phone]  Do not smoke or drink 24 hours before surgery  Prepare items to bring with you to the hospital  Shower with your chlorhexidine wash if prescribed  Brush your teeth and use your chlorhexidine dental rinse if prescribed    The Day of Surgery       Check your PAT medication instructions  Ensure you follow the instructions for when to stop eating and drinking  Shower, if prescribed use CHG.  Do not apply any lotions, creams, moisturizers, perfume or deodorant  Brush your teeth and use your CHG dental rinse if prescribed  Wear loose comfortable clothing  Avoid make-up  Remove  jewelry and piercings, consider professional piercing removal with a plastic spacer  if needed  Bring photo ID and Insurance card  Bring an accurate medication list that includes medication dose, frequency and allergies  Bring a copy of your advanced directives (will, health care power of )  Bring any devices and controllers as well as medical devices you have been provided with for surgery (CPAP, slings, braces, etc.)  Dentures, eyeglasses, and contacts will be removed before surgery, please bring cases for contacts or glasses

## 2025-02-24 ENCOUNTER — ANESTHESIA EVENT (OUTPATIENT)
Dept: OPERATING ROOM | Facility: HOSPITAL | Age: 35
End: 2025-02-24
Payer: COMMERCIAL

## 2025-02-26 ENCOUNTER — HOSPITAL ENCOUNTER (OUTPATIENT)
Facility: HOSPITAL | Age: 35
Setting detail: OUTPATIENT SURGERY
Discharge: HOME | End: 2025-02-26
Attending: STUDENT IN AN ORGANIZED HEALTH CARE EDUCATION/TRAINING PROGRAM | Admitting: STUDENT IN AN ORGANIZED HEALTH CARE EDUCATION/TRAINING PROGRAM
Payer: COMMERCIAL

## 2025-02-26 ENCOUNTER — ANESTHESIA (OUTPATIENT)
Dept: OPERATING ROOM | Facility: HOSPITAL | Age: 35
End: 2025-02-26
Payer: COMMERCIAL

## 2025-02-26 VITALS
DIASTOLIC BLOOD PRESSURE: 66 MMHG | SYSTOLIC BLOOD PRESSURE: 110 MMHG | HEIGHT: 68 IN | BODY MASS INDEX: 24.26 KG/M2 | TEMPERATURE: 97.2 F | WEIGHT: 160.05 LBS | OXYGEN SATURATION: 99 % | HEART RATE: 64 BPM | RESPIRATION RATE: 15 BRPM

## 2025-02-26 DIAGNOSIS — R00.2 HEART PALPITATIONS: ICD-10-CM

## 2025-02-26 DIAGNOSIS — E25.9 CAH 21-OH (CONGENITAL ADRENAL HYPERPLASIA), LATE ONSET (MULTI): ICD-10-CM

## 2025-02-26 DIAGNOSIS — D69.6 THROMBOCYTOPENIA (CMS-HCC): ICD-10-CM

## 2025-02-26 DIAGNOSIS — Z97.5 NEXPLANON IN PLACE: ICD-10-CM

## 2025-02-26 DIAGNOSIS — R43.8 OTHER DISTURBANCES OF SMELL AND TASTE: ICD-10-CM

## 2025-02-26 DIAGNOSIS — R32 URINARY INCONTINENCE, UNSPECIFIED TYPE: ICD-10-CM

## 2025-02-26 DIAGNOSIS — F32.81 PREMENSTRUAL DYSPHORIC DISORDER: ICD-10-CM

## 2025-02-26 DIAGNOSIS — G47.419 TYPE 2 NARCOLEPSY (HHS-HCC): ICD-10-CM

## 2025-02-26 DIAGNOSIS — D58.2 ELEVATED HEMOGLOBIN (CMS-HCC): ICD-10-CM

## 2025-02-26 DIAGNOSIS — N39.3 SUI (STRESS URINARY INCONTINENCE, FEMALE): Primary | ICD-10-CM

## 2025-02-26 DIAGNOSIS — M54.6 CHRONIC RIGHT-SIDED THORACIC BACK PAIN: ICD-10-CM

## 2025-02-26 DIAGNOSIS — E03.9 ACQUIRED HYPOTHYROIDISM: ICD-10-CM

## 2025-02-26 DIAGNOSIS — E55.9 VITAMIN D DEFICIENCY: ICD-10-CM

## 2025-02-26 DIAGNOSIS — E27.9: ICD-10-CM

## 2025-02-26 DIAGNOSIS — G89.18 POSTOPERATIVE PAIN: ICD-10-CM

## 2025-02-26 DIAGNOSIS — N91.1 SECONDARY AMENORRHEA: ICD-10-CM

## 2025-02-26 DIAGNOSIS — N32.81 OAB (OVERACTIVE BLADDER): ICD-10-CM

## 2025-02-26 DIAGNOSIS — D72.828 NEUTROPHILIC LEUKOCYTOSIS: ICD-10-CM

## 2025-02-26 DIAGNOSIS — M62.89 PELVIC FLOOR DYSFUNCTION: ICD-10-CM

## 2025-02-26 DIAGNOSIS — G89.29 CHRONIC RIGHT-SIDED THORACIC BACK PAIN: ICD-10-CM

## 2025-02-26 DIAGNOSIS — F51.01 PRIMARY INSOMNIA: ICD-10-CM

## 2025-02-26 DIAGNOSIS — G47.30 SLEEP APNEA, UNSPECIFIED TYPE: ICD-10-CM

## 2025-02-26 DIAGNOSIS — Z96.82 PRESENCE OF NEUROSTIMULATOR: ICD-10-CM

## 2025-02-26 DIAGNOSIS — R19.03 RIGHT LOWER QUADRANT ABDOMINAL MASS: ICD-10-CM

## 2025-02-26 DIAGNOSIS — R35.0 URINARY FREQUENCY: ICD-10-CM

## 2025-02-26 LAB — PREGNANCY TEST URINE, POC: NEGATIVE

## 2025-02-26 PROCEDURE — 3600000003 HC OR TIME - INITIAL BASE CHARGE - PROCEDURE LEVEL THREE: Performed by: STUDENT IN AN ORGANIZED HEALTH CARE EDUCATION/TRAINING PROGRAM

## 2025-02-26 PROCEDURE — 64595 REV/RMV PRPH SAC/GSTR NPG/R: CPT | Performed by: STUDENT IN AN ORGANIZED HEALTH CARE EDUCATION/TRAINING PROGRAM

## 2025-02-26 PROCEDURE — 2500000004 HC RX 250 GENERAL PHARMACY W/ HCPCS (ALT 636 FOR OP/ED): Performed by: NURSE ANESTHETIST, CERTIFIED REGISTERED

## 2025-02-26 PROCEDURE — 2500000004 HC RX 250 GENERAL PHARMACY W/ HCPCS (ALT 636 FOR OP/ED): Performed by: STUDENT IN AN ORGANIZED HEALTH CARE EDUCATION/TRAINING PROGRAM

## 2025-02-26 PROCEDURE — 2500000004 HC RX 250 GENERAL PHARMACY W/ HCPCS (ALT 636 FOR OP/ED): Performed by: REGISTERED NURSE

## 2025-02-26 PROCEDURE — 2500000005 HC RX 250 GENERAL PHARMACY W/O HCPCS: Performed by: STUDENT IN AN ORGANIZED HEALTH CARE EDUCATION/TRAINING PROGRAM

## 2025-02-26 PROCEDURE — 7100000009 HC PHASE TWO TIME - INITIAL BASE CHARGE: Performed by: STUDENT IN AN ORGANIZED HEALTH CARE EDUCATION/TRAINING PROGRAM

## 2025-02-26 PROCEDURE — 3700000001 HC GENERAL ANESTHESIA TIME - INITIAL BASE CHARGE: Performed by: STUDENT IN AN ORGANIZED HEALTH CARE EDUCATION/TRAINING PROGRAM

## 2025-02-26 PROCEDURE — L8606 SYNTHETIC IMPLNT URINARY 1ML: HCPCS | Performed by: STUDENT IN AN ORGANIZED HEALTH CARE EDUCATION/TRAINING PROGRAM

## 2025-02-26 PROCEDURE — 81025 URINE PREGNANCY TEST: CPT | Performed by: ANESTHESIOLOGY

## 2025-02-26 PROCEDURE — A51715 PR ENDOSCOPIC INJECTION/IMPLANT: Performed by: NURSE ANESTHETIST, CERTIFIED REGISTERED

## 2025-02-26 PROCEDURE — A51715 PR ENDOSCOPIC INJECTION/IMPLANT: Performed by: ANESTHESIOLOGY

## 2025-02-26 PROCEDURE — 2500000004 HC RX 250 GENERAL PHARMACY W/ HCPCS (ALT 636 FOR OP/ED): Performed by: ANESTHESIOLOGY

## 2025-02-26 PROCEDURE — 3700000002 HC GENERAL ANESTHESIA TIME - EACH INCREMENTAL 1 MINUTE: Performed by: STUDENT IN AN ORGANIZED HEALTH CARE EDUCATION/TRAINING PROGRAM

## 2025-02-26 PROCEDURE — 2720000007 HC OR 272 NO HCPCS: Performed by: STUDENT IN AN ORGANIZED HEALTH CARE EDUCATION/TRAINING PROGRAM

## 2025-02-26 PROCEDURE — 51715 ENDOSCOPIC INJECTION/IMPLANT: CPT | Performed by: STUDENT IN AN ORGANIZED HEALTH CARE EDUCATION/TRAINING PROGRAM

## 2025-02-26 PROCEDURE — 7100000010 HC PHASE TWO TIME - EACH INCREMENTAL 1 MINUTE: Performed by: STUDENT IN AN ORGANIZED HEALTH CARE EDUCATION/TRAINING PROGRAM

## 2025-02-26 PROCEDURE — 2780000003 HC OR 278 NO HCPCS: Performed by: STUDENT IN AN ORGANIZED HEALTH CARE EDUCATION/TRAINING PROGRAM

## 2025-02-26 PROCEDURE — 3600000008 HC OR TIME - EACH INCREMENTAL 1 MINUTE - PROCEDURE LEVEL THREE: Performed by: STUDENT IN AN ORGANIZED HEALTH CARE EDUCATION/TRAINING PROGRAM

## 2025-02-26 DEVICE — BULKING SYSTEM, BULKAMID URETHRAL: Type: IMPLANTABLE DEVICE | Site: URETHRA | Status: NON-FUNCTIONAL

## 2025-02-26 RX ORDER — ONDANSETRON HYDROCHLORIDE 2 MG/ML
4 INJECTION, SOLUTION INTRAVENOUS ONCE AS NEEDED
Status: DISCONTINUED | OUTPATIENT
Start: 2025-02-26 | End: 2025-02-26 | Stop reason: HOSPADM

## 2025-02-26 RX ORDER — POLYETHYLENE GLYCOL 3350 17 G/17G
17 POWDER, FOR SOLUTION ORAL DAILY
Qty: 510 G | Refills: 0 | Status: SHIPPED | OUTPATIENT
Start: 2025-02-26 | End: 2025-03-28

## 2025-02-26 RX ORDER — DROPERIDOL 2.5 MG/ML
0.62 INJECTION, SOLUTION INTRAMUSCULAR; INTRAVENOUS ONCE AS NEEDED
Status: DISCONTINUED | OUTPATIENT
Start: 2025-02-26 | End: 2025-02-26 | Stop reason: HOSPADM

## 2025-02-26 RX ORDER — ALBUTEROL SULFATE 0.83 MG/ML
2.5 SOLUTION RESPIRATORY (INHALATION) ONCE AS NEEDED
Status: DISCONTINUED | OUTPATIENT
Start: 2025-02-26 | End: 2025-02-26 | Stop reason: HOSPADM

## 2025-02-26 RX ORDER — GENTAMICIN 40 MG/ML
INJECTION, SOLUTION INTRAMUSCULAR; INTRAVENOUS AS NEEDED
Status: DISCONTINUED | OUTPATIENT
Start: 2025-02-26 | End: 2025-02-26 | Stop reason: HOSPADM

## 2025-02-26 RX ORDER — KETOROLAC TROMETHAMINE 10 MG/1
10 TABLET, FILM COATED ORAL EVERY 6 HOURS PRN
Qty: 20 TABLET | Refills: 0 | Status: SHIPPED | OUTPATIENT
Start: 2025-02-26

## 2025-02-26 RX ORDER — CEFAZOLIN 1 G/1
INJECTION, POWDER, FOR SOLUTION INTRAVENOUS AS NEEDED
Status: DISCONTINUED | OUTPATIENT
Start: 2025-02-26 | End: 2025-02-26

## 2025-02-26 RX ORDER — LIDOCAINE HCL/PF 100 MG/5ML
SYRINGE (ML) INTRAVENOUS AS NEEDED
Status: DISCONTINUED | OUTPATIENT
Start: 2025-02-26 | End: 2025-02-26

## 2025-02-26 RX ORDER — PHENYLEPHRINE HCL IN 0.9% NACL 0.4MG/10ML
SYRINGE (ML) INTRAVENOUS AS NEEDED
Status: DISCONTINUED | OUTPATIENT
Start: 2025-02-26 | End: 2025-02-26

## 2025-02-26 RX ORDER — SODIUM CHLORIDE, SODIUM LACTATE, POTASSIUM CHLORIDE, CALCIUM CHLORIDE 600; 310; 30; 20 MG/100ML; MG/100ML; MG/100ML; MG/100ML
100 INJECTION, SOLUTION INTRAVENOUS CONTINUOUS
Status: DISCONTINUED | OUTPATIENT
Start: 2025-02-26 | End: 2025-02-26 | Stop reason: HOSPADM

## 2025-02-26 RX ORDER — LIDOCAINE HYDROCHLORIDE AND EPINEPHRINE 10; 10 UG/ML; MG/ML
INJECTION, SOLUTION INFILTRATION; PERINEURAL AS NEEDED
Status: DISCONTINUED | OUTPATIENT
Start: 2025-02-26 | End: 2025-02-26 | Stop reason: HOSPADM

## 2025-02-26 RX ORDER — DOCUSATE SODIUM 100 MG/1
100 CAPSULE, LIQUID FILLED ORAL 2 TIMES DAILY
Qty: 60 CAPSULE | Refills: 0 | Status: SHIPPED | OUTPATIENT
Start: 2025-02-26 | End: 2025-03-28

## 2025-02-26 RX ORDER — PROPOFOL 10 MG/ML
INJECTION, EMULSION INTRAVENOUS AS NEEDED
Status: DISCONTINUED | OUTPATIENT
Start: 2025-02-26 | End: 2025-02-26

## 2025-02-26 RX ORDER — CEFAZOLIN SODIUM 2 G/100ML
2 INJECTION, SOLUTION INTRAVENOUS ONCE
Status: DISCONTINUED | OUTPATIENT
Start: 2025-02-26 | End: 2025-02-26 | Stop reason: HOSPADM

## 2025-02-26 RX ORDER — OXYCODONE HYDROCHLORIDE 5 MG/1
5 TABLET ORAL EVERY 4 HOURS PRN
Status: DISCONTINUED | OUTPATIENT
Start: 2025-02-26 | End: 2025-02-26 | Stop reason: HOSPADM

## 2025-02-26 RX ORDER — DEXMEDETOMIDINE IN 0.9 % NACL 20 MCG/5ML
SYRINGE (ML) INTRAVENOUS AS NEEDED
Status: DISCONTINUED | OUTPATIENT
Start: 2025-02-26 | End: 2025-02-26

## 2025-02-26 RX ORDER — KETOROLAC TROMETHAMINE 30 MG/ML
INJECTION, SOLUTION INTRAMUSCULAR; INTRAVENOUS AS NEEDED
Status: DISCONTINUED | OUTPATIENT
Start: 2025-02-26 | End: 2025-02-26

## 2025-02-26 RX ORDER — TRAMADOL HYDROCHLORIDE 50 MG/1
50 TABLET ORAL EVERY 6 HOURS PRN
Qty: 15 TABLET | Refills: 0 | Status: SHIPPED | OUTPATIENT
Start: 2025-02-26

## 2025-02-26 RX ORDER — MEPERIDINE HYDROCHLORIDE 25 MG/ML
12.5 INJECTION INTRAMUSCULAR; INTRAVENOUS; SUBCUTANEOUS EVERY 10 MIN PRN
Status: DISCONTINUED | OUTPATIENT
Start: 2025-02-26 | End: 2025-02-26 | Stop reason: HOSPADM

## 2025-02-26 RX ORDER — MIDAZOLAM HYDROCHLORIDE 1 MG/ML
INJECTION INTRAMUSCULAR; INTRAVENOUS AS NEEDED
Status: DISCONTINUED | OUTPATIENT
Start: 2025-02-26 | End: 2025-02-26

## 2025-02-26 RX ORDER — GLYCOPYRROLATE 0.2 MG/ML
INJECTION INTRAMUSCULAR; INTRAVENOUS AS NEEDED
Status: DISCONTINUED | OUTPATIENT
Start: 2025-02-26 | End: 2025-02-26

## 2025-02-26 RX ORDER — SODIUM CHLORIDE, SODIUM LACTATE, POTASSIUM CHLORIDE, CALCIUM CHLORIDE 600; 310; 30; 20 MG/100ML; MG/100ML; MG/100ML; MG/100ML
20 INJECTION, SOLUTION INTRAVENOUS CONTINUOUS
Status: DISCONTINUED | OUTPATIENT
Start: 2025-02-26 | End: 2025-02-26 | Stop reason: HOSPADM

## 2025-02-26 RX ORDER — ONDANSETRON HYDROCHLORIDE 2 MG/ML
INJECTION, SOLUTION INTRAVENOUS AS NEEDED
Status: DISCONTINUED | OUTPATIENT
Start: 2025-02-26 | End: 2025-02-26

## 2025-02-26 RX ORDER — ADHESIVE BANDAGE
15 BANDAGE TOPICAL DAILY PRN
Qty: 360 ML | Refills: 0 | Status: SHIPPED | OUTPATIENT
Start: 2025-02-26

## 2025-02-26 RX ORDER — DIPHENHYDRAMINE HYDROCHLORIDE 50 MG/ML
12.5 INJECTION INTRAMUSCULAR; INTRAVENOUS ONCE AS NEEDED
Status: DISCONTINUED | OUTPATIENT
Start: 2025-02-26 | End: 2025-02-26 | Stop reason: HOSPADM

## 2025-02-26 RX ORDER — FENTANYL CITRATE 50 UG/ML
INJECTION, SOLUTION INTRAMUSCULAR; INTRAVENOUS AS NEEDED
Status: DISCONTINUED | OUTPATIENT
Start: 2025-02-26 | End: 2025-02-26

## 2025-02-26 RX ADMIN — Medication 4 MCG: at 16:19

## 2025-02-26 RX ADMIN — FENTANYL CITRATE 25 MCG: 50 INJECTION, SOLUTION INTRAMUSCULAR; INTRAVENOUS at 16:19

## 2025-02-26 RX ADMIN — Medication 8 MCG: at 15:58

## 2025-02-26 RX ADMIN — MIDAZOLAM HYDROCHLORIDE 2 MG: 1 INJECTION, SOLUTION INTRAMUSCULAR; INTRAVENOUS at 15:16

## 2025-02-26 RX ADMIN — FENTANYL CITRATE 25 MCG: 50 INJECTION, SOLUTION INTRAMUSCULAR; INTRAVENOUS at 15:39

## 2025-02-26 RX ADMIN — Medication 8 MCG: at 16:21

## 2025-02-26 RX ADMIN — KETOROLAC TROMETHAMINE 30 MG: 30 INJECTION, SOLUTION INTRAMUSCULAR; INTRAVENOUS at 16:25

## 2025-02-26 RX ADMIN — PROPOFOL 70 MG: 10 INJECTION, EMULSION INTRAVENOUS at 15:25

## 2025-02-26 RX ADMIN — CEFAZOLIN 2 G: 330 INJECTION, POWDER, FOR SOLUTION INTRAMUSCULAR; INTRAVENOUS at 15:28

## 2025-02-26 RX ADMIN — FENTANYL CITRATE 25 MCG: 50 INJECTION, SOLUTION INTRAMUSCULAR; INTRAVENOUS at 15:35

## 2025-02-26 RX ADMIN — ONDANSETRON 4 MG: 2 INJECTION, SOLUTION INTRAMUSCULAR; INTRAVENOUS at 15:28

## 2025-02-26 RX ADMIN — DEXAMETHASONE SODIUM PHOSPHATE 4 MG: 4 INJECTION INTRA-ARTICULAR; INTRALESIONAL; INTRAMUSCULAR; INTRAVENOUS; SOFT TISSUE at 15:28

## 2025-02-26 RX ADMIN — PROPOFOL 140 MCG/KG/MIN: 10 INJECTION, EMULSION INTRAVENOUS at 15:25

## 2025-02-26 RX ADMIN — FENTANYL CITRATE 25 MCG: 50 INJECTION, SOLUTION INTRAMUSCULAR; INTRAVENOUS at 15:49

## 2025-02-26 RX ADMIN — GLYCOPYRROLATE 0.2 MG: 0.2 INJECTION, SOLUTION INTRAMUSCULAR; INTRAVENOUS at 16:40

## 2025-02-26 RX ADMIN — MIDAZOLAM HYDROCHLORIDE 2 MG: 1 INJECTION, SOLUTION INTRAMUSCULAR; INTRAVENOUS at 15:20

## 2025-02-26 RX ADMIN — Medication 120 MCG: at 16:40

## 2025-02-26 RX ADMIN — PROPOFOL 40 MG: 10 INJECTION, EMULSION INTRAVENOUS at 15:39

## 2025-02-26 RX ADMIN — SODIUM CHLORIDE, POTASSIUM CHLORIDE, SODIUM LACTATE AND CALCIUM CHLORIDE 20 ML/HR: 600; 310; 30; 20 INJECTION, SOLUTION INTRAVENOUS at 13:58

## 2025-02-26 RX ADMIN — LIDOCAINE HYDROCHLORIDE 60 MG: 20 INJECTION INTRAVENOUS at 15:25

## 2025-02-26 SDOH — HEALTH STABILITY: MENTAL HEALTH: CURRENT SMOKER: 0

## 2025-02-26 ASSESSMENT — PAIN - FUNCTIONAL ASSESSMENT
PAIN_FUNCTIONAL_ASSESSMENT: UNABLE TO SELF-REPORT
PAIN_FUNCTIONAL_ASSESSMENT: 0-10

## 2025-02-26 ASSESSMENT — PAIN SCALES - GENERAL: PAINLEVEL_OUTOF10: 0 - NO PAIN

## 2025-02-26 NOTE — ANESTHESIA PREPROCEDURE EVALUATION
Patient: Sanjuana Lawson    Procedure Information       Date/Time: 02/26/25 1450    Procedures:       CYSTOSCOPY, WITH COLLAGEN INJECTION - ~30 min for procedure bulking, removal of interstim      REMOVAL, NEUROSTIMULATOR, BLADDER    Location: [facility] / [facility]    Surgeons: Hernandez Woods MD            Relevant Problems   Anesthesia (within normal limits)      Cardiac (within normal limits)      Pulmonary   (+) Mild intermittent asthma      Neuro   (+) Premenstrual dysphoric disorder      GI (within normal limits)      /Renal (within normal limits)      Liver (within normal limits)      Endocrine   (+) Acquired hypothyroidism      Hematology   (+) Thrombocytopenia (CMS-HCC)      Musculoskeletal (within normal limits)      HEENT (within normal limits)      ID (within normal limits)      Skin (within normal limits)      GYN (within normal limits)       Clinical information reviewed:   Tobacco  Allergies  Meds  Problems  Med Hx  Surg Hx  OB Status    Fam Hx  Soc Hx        NPO Detail:  NPO/Void Status  Carbohydrate Drink Given Prior to Surgery? : N  Date of Last Liquid: 02/26/25  Time of Last Liquid: 0800  Date of Last Solid: 02/25/25  Time of Last Solid: 2000  Last Intake Type: Clear fluids  Time of Last Void: 1330         Physical Exam    Airway  Mallampati: I  TM distance: >3 FB  Neck ROM: full     Cardiovascular - normal exam     Dental - normal exam     Pulmonary - normal exam     Abdominal - normal exam           Anesthesia Plan    History of general anesthesia?: yes  History of complications of general anesthesia?: no    ASA 2     general     The patient is not a current smoker.  Patient was not previously instructed to abstain from smoking on day of procedure.  Patient did not smoke on day of procedure.    intravenous induction   Anesthetic plan and risks discussed with patient.  Use of blood products discussed with patient who consented to blood products.

## 2025-02-26 NOTE — OP NOTE
CYSTOSCOPY, WITH COLLAGEN INJECTION, REMOVAL, NEUROSTIMULATOR, BLADDER Operative Note     Date: 2025  OR Location: [facility]    Name: Sanjuana Lawson, : 1990, Age: 34 y.o., MRN: [id number], Sex: female    Diagnosis  Pre-op Diagnosis      * VIVIAN (stress urinary incontinence, female) [N39.3] Post-op Diagnosis     * VIVIAN (stress urinary incontinence, female) [N39.3]     Procedures  CYSTOSCOPY, WITH COLLAGEN INJECTION    REMOVAL, NEUROSTIMULATOR, BLADDER  [address]259 - [place] REV[facility]RMV [facility]      Surgeons      * Hernandez Woods - Primary    Resident/Fellow/Other Assistant:  Surgeons and Role:     * Selene Quiñones MD - Resident - Assisting    Staff:   Circulator: Iliana Kern Person: Samantha  Surgical Assistant: Tu MADDENA: Itz    Anesthesia Staff: Anesthesiologist: Mani Naranjo MD  CRNA: ERNA Hunt-CRNA; ERAN Burnham-CRNA    Procedure Summary  Anesthesia: General  ASA: II  Estimated Blood Loss: 5 mL  Intra-op Medications:   Administrations occurring from 1450 to 1605 on 25:   Medication Name Total Dose   lidocaine-epinephrine (Xylocaine W/EPI) 1 %-1:100,000 injection 7 mL   gentamicin (Garamycin) 80 mg in sodium chloride 0.9 % 100 mL irrigation 80 mg   ceFAZolin (Ancef) vial 1 g 2 g   dexAMETHasone (Decadron) injection 4 mg/mL 4 mg   dexMEDETOMidine 4 mcg/mL in NS syringe 8 mcg   fentaNYL (Sublimaze) injection 50 mcg/mL 75 mcg   lidocaine (cardiac) injection 2% prefilled syringe 60 mg   midazolam PF (Versed) injection 1 mg/mL 4 mg   ondansetron (Zofran) 2 mg/mL injection 4 mg   propofol (Diprivan) injection 10 mg/mL 574.47 mg              Anesthesia Record               Intraprocedure I/O Totals       None           Specimen: No specimens collected              Drains and/or Catheters: * None in log *    Tourniquet Times:         Implants:  Implants       Type Name Action Serial No.      Implant BULKING SYSTEM, BULKAMID URETHRAL - [id number] Used, Not Implanted                Findings: lead removed intact, good coaptation     Indications: Sanjuana Lawson is an 34 y.o. female who is having surgery for VIVIAN (stress urinary incontinence, female) [N39.3].     The patient was seen in the preoperative area. The risks, benefits, complications, treatment options, non-operative alternatives, expected recovery and outcomes were discussed with the patient. The possibilities of reaction to medication, pulmonary aspiration, injury to surrounding structures, bleeding, recurrent infection, the need for additional procedures, failure to diagnose a condition, and creating a complication requiring transfusion or operation were discussed with the patient. The patient concurred with the proposed plan, giving informed consent.  The site of surgery was properly noted/marked if necessary per policy. The patient has been actively warmed in preoperative area. Preoperative antibiotics have been ordered and given within 1 hours of incision. Venous thrombosis prophylaxis have been ordered including bilateral sequential compression devices    Procedure Details: After informed consent was obtained the patient was prepped and draped in usual   sterile fashion after she is placed under conscious sedation and in prone   position.  The old IPG incision was identified on the right , it was   injected with 1% lidocaine with epinephrine and open.  The capsule was entered   sharply and dissected off the IPG which was removed without difficulty.   We then applied gentle traction through the InterStim lead and identified its   insertion point at the paraspinal region.  A simple cutdown was used to get   access to the lead after the overlying skin was injected with 1% lidocaine with   epinephrine.  The lead was grasped with a hemostat and gently removed   completely.       The subcutaneous tissue was of the paraspinal and gluteal incisions was  reapproximated with 2-0 Vicryl. The skin was closed with a 4-0  subcuticular   stitch. A simple stitch was placed over the midline site at the lead insertion.   Sponge and instrument counts were correct. At The patient tolerated the   procedure well, and was transferred to the recovery room in excellent   condition.    Complications:  None; patient tolerated the procedure well.    Disposition: PACU - hemodynamically stable.  Condition: stable                     Additional Details:     Attending Attestation: I was present and scrubbed for the entire procedure.    Hernandez Woods  Phone Number: [phone]

## 2025-02-26 NOTE — ANESTHESIA POSTPROCEDURE EVALUATION
Patient: Sanjuana Lawson    Procedure Summary       Date: 02/26/25 Room / Location: [facility]    Anesthesia Start: 1514 Anesthesia Stop: 1643    Procedures:       CYSTOSCOPY, WITH COLLAGEN INJECTION      REMOVAL, NEUROSTIMULATOR, BLADDER Diagnosis:       VIVIAN (stress urinary incontinence, female)      (VIVIAN (stress urinary incontinence, female) [N39.3])    Surgeons: Hernandez Woods MD Responsible Provider: Mani Naranjo MD    Anesthesia Type: general ASA Status: 2            Anesthesia Type: general    Vitals Value Taken Time   BP See vitals 02/26/25 1703   Temp  02/26/25 1703   Pulse  02/26/25 1703   Resp  02/26/25 1703   SpO2  02/26/25 1703       Anesthesia Post Evaluation    Patient location during evaluation: PACU  Patient participation: complete - patient participated  Level of consciousness: awake  Pain management: adequate  Multimodal analgesia pain management approach  Airway patency: patent  Two or more strategies used to mitigate risk of obstructive sleep apnea  Cardiovascular status: acceptable  Respiratory status: acceptable  Hydration status: acceptable  Postoperative Nausea and Vomiting: none        No notable events documented.

## 2025-02-26 NOTE — H&P
History Of Present Illness  Sanjuana Lawson is a 34 y.o. female presenting for cystoscopy, collagen injection, removal of neurostimulator.     Past Medical History  Past Medical History:   Diagnosis Date    History of chickenpox     History of vaccination against human papillomavirus     Hypothyroidism     Narcolepsy     OAB (overactive bladder)     Other conditions influencing health status     Uncomplicated asthma, unspecified asthma severity    Pelvic floor dysfunction     Personal history of other infectious and parasitic diseases     S/P Botox injection 09/20/2018    VIVIAN (stress urinary incontinence, female)     Suspected sleep apnea     not diagnosed officially       Surgical History  Past Surgical History:   Procedure Laterality Date    BLADDER SURGERY      complete interstem , battery replaced, botox injection    CYSTOSCOPY  07/15/2024    BOTOX     Social History  She reports that she has never smoked. She has never used smokeless tobacco. She reports current alcohol use of about 1.0 standard drink of alcohol per week. She reports that she does not use drugs.    Family History  Family History   Problem Relation Name Age of Onset    No Known Problems Mother      No Known Problems Father      Lung cancer Father's Brother          unspecified laterally, unspecified part of lung    Breast cancer Maternal Grandmother          unspecified site of breast, unspecified laterally    Other (cva) Maternal Grandfather          due to embolism of precebral artery    Breast cancer Paternal Grandfather          unspecified laterally, unspecified site of breast    Cancer Other multiple family members     Hypertension Other multiple family members         Allergies  Patient has no known allergies.     Physical Exam   General: no acute distress  HEENT: normocephalic, atraumatic  Heart: warm and well perfused  Lungs: no increased WOB  Abd: soft, nontender  Extremities: moving all extremities    Last Recorded Vitals  There were no  vitals taken for this visit.     Assessment/Plan   Sanjuana Lawson is a 34 y.o. female presenting for cystoscopy, collagen injection, removal of neurostimulator.  - Consent signed  - Has completed PAT and is appropriate for OR  - Pre-procedure medications ordered  - Plan for same-day discharge    Seen and d/w Dr. Peggy Quiñones MD PGY2

## 2025-03-27 ENCOUNTER — APPOINTMENT (OUTPATIENT)
Dept: UROLOGY | Facility: CLINIC | Age: 35
End: 2025-03-27
Payer: COMMERCIAL

## 2025-03-27 DIAGNOSIS — R10.2 PELVIC PAIN IN FEMALE: ICD-10-CM

## 2025-03-27 DIAGNOSIS — N32.81 OAB (OVERACTIVE BLADDER): Primary | ICD-10-CM

## 2025-03-27 DIAGNOSIS — M62.89 PELVIC FLOOR DYSFUNCTION: ICD-10-CM

## 2025-03-27 DIAGNOSIS — N39.3 SUI (STRESS URINARY INCONTINENCE, FEMALE): ICD-10-CM

## 2025-03-27 PROCEDURE — 99214 OFFICE O/P EST MOD 30 MIN: CPT | Performed by: STUDENT IN AN ORGANIZED HEALTH CARE EDUCATION/TRAINING PROGRAM

## 2025-03-27 PROCEDURE — G2211 COMPLEX E/M VISIT ADD ON: HCPCS | Performed by: STUDENT IN AN ORGANIZED HEALTH CARE EDUCATION/TRAINING PROGRAM

## 2025-03-27 NOTE — PROGRESS NOTES
HISTORY OF PRESENT ILLNESS:  Sanjuana Lawson is a 34 y.o. female who presents today status post collagen injection and removal of neurostimulator on 2/26/25. She is doing well since the removal of the device and doing well with her stress incontinence and the injection. She is having some urgency.          Past Medical History  She has a past medical history of History of chickenpox, History of vaccination against human papillomavirus, Hypothyroidism, Narcolepsy, OAB (overactive bladder), Other conditions influencing health status, Pelvic floor dysfunction, Personal history of other infectious and parasitic diseases, S/P Botox injection (09/20/2018), VIVIAN (stress urinary incontinence, female), and Suspected sleep apnea.    Surgical History  She has a past surgical history that includes Bladder surgery and Cystoscopy (07/15/2024).     Social History  She reports that she has never smoked. She has never used smokeless tobacco. She reports current alcohol use of about 1.0 standard drink of alcohol per week. She reports that she does not use drugs.    Family History  Family History   Problem Relation Name Age of Onset    No Known Problems Mother      No Known Problems Father      Lung cancer Father's Brother          unspecified laterally, unspecified part of lung    Breast cancer Maternal Grandmother          unspecified site of breast, unspecified laterally    Other (cva) Maternal Grandfather          due to embolism of precebral artery    Breast cancer Paternal Grandfather          unspecified laterally, unspecified site of breast    Cancer Other multiple family members     Hypertension Other multiple family members         Allergies  Patient has no known allergies.      A comprehensive 10+ review of systems was negative except for: see hpi                          PHYSICAL EXAMINATION:  BP Readings from Last 3 Encounters:   02/26/25 110/66   08/26/24 125/65   07/15/24 116/76      Wt Readings from Last 3 Encounters:  "  02/26/25 72.6 kg (160 lb 0.9 oz)   02/18/25 73.8 kg (162 lb 11.2 oz)   08/26/24 70.8 kg (156 lb 1.4 oz)      BMI: Estimated body mass index is 24.34 kg/m² as calculated from the following:    Height as of 2/26/25: 1.727 m (5' 8\").    Weight as of 2/26/25: 72.6 kg (160 lb 0.9 oz).  BSA: Estimated body surface area is 1.87 meters squared as calculated from the following:    Height as of 2/26/25: 1.727 m (5' 8\").    Weight as of 2/26/25: 72.6 kg (160 lb 0.9 oz).  HEENT: Normocephalic, atraumatic, PER EOMI, nonicteric, trachea normal, thyroid normal, oropharynx normal.  CARDIAC: regular rate & rhythm, S1 & S2 normal.  No heaves, thrills, gallops or murmurs.  LUNGS: Clear to auscultation, no spinal or CV tenderness.  EXTREMITIES: No evidence of cyanosis, clubbing or edema.               Assessment:  Sanjuana Lawson is a 34 y.o. who presents with treatment refractory urinary urge incontinence     UUI:  -Has tried virtually every therapy including neuromodulation with sacral implant and PTNS, currently has the InterStim in place, Botox, all 7 medications,   -Cystoscopy normal   -Recent urodynamics demonstrates detrusor overactivity   -She does complain of some stress incontinence and this may indicate that she may have dysfunctional voiding   -Continue pelvic floor therapy with Leda   -Currently taking vaginal Valium to see if this improves her symptoms if it does we can consider pelvic floor Botox, I will tentatively schedule her for this in June   -Flomax worsened symptoms, discussed bladder sling but rather start with botox we will start with pelvic floor Botox   -We discussed potentially performing a bladder sling versus urethral bulking   -S/P pelvic floor botox, 200 units, 7/15/24, >90% improvement   -200 units bladder botox 11/14/24, used samples, significant improvement, but sx returning  -S/P removal of IPG and bulkamid injection on 2/26/25, has some urgency, no VIVIAN, discussed that botox may limit durability of " bulking  -plan on repeating botox       VIVIAN:  Improved markedly with PT, PF Botox 7/15/2024  S/p bulking 2/26/25     Adrenal Insufficiency:   -No new complaints  -following with endocrine       Follow up in 3 months        All questions and concerns were answered and addressed.  The patient expressed understanding and agrees with the plan.     Hernandez Woods MD    Scribe Attestation  By signing my name below, I, Janina Maldonado, Scribrui   attest that this documentation has been prepared under the direction and in the presence of Hernandez Woods MD.

## 2025-05-04 ENCOUNTER — OFFICE VISIT (OUTPATIENT)
Dept: URGENT CARE | Age: 35
End: 2025-05-04
Payer: COMMERCIAL

## 2025-05-04 VITALS
DIASTOLIC BLOOD PRESSURE: 80 MMHG | TEMPERATURE: 98 F | HEIGHT: 69 IN | RESPIRATION RATE: 16 BRPM | OXYGEN SATURATION: 100 % | WEIGHT: 160 LBS | SYSTOLIC BLOOD PRESSURE: 120 MMHG | BODY MASS INDEX: 23.7 KG/M2 | HEART RATE: 57 BPM

## 2025-05-04 DIAGNOSIS — B96.89 ACUTE BACTERIAL SINUSITIS: Primary | ICD-10-CM

## 2025-05-04 DIAGNOSIS — R05.9 COUGH, UNSPECIFIED TYPE: ICD-10-CM

## 2025-05-04 DIAGNOSIS — J01.90 ACUTE BACTERIAL SINUSITIS: Primary | ICD-10-CM

## 2025-05-04 DIAGNOSIS — J20.9 ACUTE BRONCHITIS, UNSPECIFIED ORGANISM: ICD-10-CM

## 2025-05-04 PROCEDURE — 1036F TOBACCO NON-USER: CPT | Performed by: NURSE PRACTITIONER

## 2025-05-04 PROCEDURE — 99213 OFFICE O/P EST LOW 20 MIN: CPT | Performed by: NURSE PRACTITIONER

## 2025-05-04 PROCEDURE — 3008F BODY MASS INDEX DOCD: CPT | Performed by: NURSE PRACTITIONER

## 2025-05-04 RX ORDER — PREDNISONE 20 MG/1
40 TABLET ORAL DAILY
Qty: 10 TABLET | Refills: 0 | Status: SHIPPED | OUTPATIENT
Start: 2025-05-04 | End: 2025-05-09

## 2025-05-04 RX ORDER — AMOXICILLIN AND CLAVULANATE POTASSIUM 875; 125 MG/1; MG/1
875 TABLET, FILM COATED ORAL 2 TIMES DAILY
Qty: 20 TABLET | Refills: 0 | Status: SHIPPED | OUTPATIENT
Start: 2025-05-04 | End: 2025-05-14

## 2025-05-04 RX ORDER — GUAIFENESIN 600 MG/1
600 TABLET, EXTENDED RELEASE ORAL 2 TIMES DAILY
Qty: 20 TABLET | Refills: 0 | Status: SHIPPED | OUTPATIENT
Start: 2025-05-04 | End: 2025-05-14

## 2025-05-04 ASSESSMENT — ENCOUNTER SYMPTOMS
SORE THROAT: 1
FATIGUE: 1
COUGH: 1
SINUS PRESSURE: 1
RHINORRHEA: 1
SINUS PAIN: 1

## 2025-05-04 ASSESSMENT — PAIN SCALES - GENERAL: PAINLEVEL_OUTOF10: 0-NO PAIN

## 2025-05-04 NOTE — PROGRESS NOTES
"Subjective   Patient ID: Sanjuana Lawson is a 34 y.o. female. They present today with a chief complaint of Cough (Patient states she did have the chills, body aches, but those are gone and still has the cough for over 1 week) and Nasal Congestion.    History of Present Illness  Patient is a 34-year-old female presents today with complaints of ongoing and worsening cough.  She reports her symptoms are ongoing for over a week and started with symptoms of chills, body aches, nasal congestion and discharge.  She reports symptoms of nasal congestion with worsening yellow and green discharge are ongoing along with worsening cough and chest congestion with productive yellow sputum.  She denies symptoms of wheezing or shortness of breath.    Past Medical History  Allergies as of 05/04/2025    (No Known Allergies)       Prescriptions Prior to Admission[1]     Medical History[2]    Surgical History[3]     reports that she has never smoked. She has never used smokeless tobacco. She reports current alcohol use of about 1.0 standard drink of alcohol per week. She reports that she does not use drugs.    Review of Systems  Review of Systems   Constitutional:  Positive for fatigue.   HENT:  Positive for congestion, postnasal drip, rhinorrhea, sinus pressure, sinus pain and sore throat.    Respiratory:  Positive for cough.    All other systems reviewed and are negative.                                 Objective    Vitals:    05/04/25 0923   BP: 120/80   BP Location: Right arm   Patient Position: Sitting   Pulse: 57   Resp: 16   Temp: 36.7 °C (98 °F)   TempSrc: Oral   SpO2: 100%   Weight: 72.6 kg (160 lb)   Height: 1.74 m (5' 8.5\")     Patient's last menstrual period was 04/26/2025 (approximate).    Physical Exam  Vitals reviewed.   General: Vitals Noted. No distress. Normocephalic.  Cardiovascular:     Heart sounds: Normal heart sounds, S1 normal and S2 normal. No murmur heard.     No friction rub.   Pulmonary:      Effort: Pulmonary " effort is normal.      Breath sounds:  Lungs with rhonchi to auscultation bilaterally   HEENT: Left and right TM is within normal limits. No drainage.  EOMI, normal conjunctiva, patent nares, Normal OP Noted maxillary and frontal sinus tenderness on palpation is present. Pharynx and tonsils are hyperemic, and without exudate.   Neck: Supple with no adenopathy.  Lymphadenopathy:   No cervical adenopathy on palpation  Lower Body: No right inguinal adenopathy. No left inguinal adenopathy.   Abdominal:      Palpations: There is no hepatomegaly, splenomegaly or mass. Abdomen is soft, non-tender, and non-distended. No suprapubic tenderness. No CVA tenderness.   Skin:     Comments: no rash   Neurological:      Cranial Nerves: Cranial nerves 2-12 are intact.      Sensory: No sensory deficit.      Motor: Motor function is intact.      Deep Tendon Reflexes: Reflexes are normal and symmetric.       Procedures    Point of Care Test & Imaging Results from this visit  No results found for this visit on 05/04/25.   Imaging  No results found.    Cardiology, Vascular, and Other Imaging  No other imaging results found for the past 2 days      Diagnostic study results (if any) were reviewed by SUE Steele.    Assessment/Plan   Allergies, medications, history, and pertinent labs/EKGs/Imaging reviewed by SUE Steele.     Medical Decision Making  Patient is alert and oriented x 3 no acute distress.  Presents with ongoing and worsening symptoms of nasal congestion, yellow and green thick nasal discharge, cough and chest congestion with productive yellow sputum.  In the setting of the symptoms duration being over a week, no testing in office is warranted today.  Given patient's presentation and findings on examination, patient will be treated for acute bacterial sinusitis and acute bronchitis in the setting of atypical infection.  Antibiotic p.o. for 10 days is issued as prescribed below.  Patient started on  prednisone to help with severity of congestion and inflammation.  Advised to use over-the-counter medication to help with symptom relief.  Patient verbalized understanding and agreed with the plan.  Signs and symptoms significant for presentation to ER were discussed with the patient today.    Orders and Diagnoses  There are no diagnoses linked to this encounter.    Medical Admin Record      Patient disposition: Home    Electronically signed by SUE Steele  9:26 AM           [1] (Not in a hospital admission)  [2]   Past Medical History:  Diagnosis Date    History of chickenpox     History of vaccination against human papillomavirus     Hypothyroidism     Narcolepsy     OAB (overactive bladder)     Other conditions influencing health status     Uncomplicated asthma, unspecified asthma severity    Pelvic floor dysfunction     Personal history of other infectious and parasitic diseases     S/P Botox injection 09/20/2018    VIVIAN (stress urinary incontinence, female)     Suspected sleep apnea     not diagnosed officially   [3]   Past Surgical History:  Procedure Laterality Date    BLADDER SURGERY      complete interstem , battery replaced, botox injection    CYSTOSCOPY  07/15/2024    BOTOX

## 2025-06-04 NOTE — PROGRESS NOTES
"HISTORY OF PRESENT ILLNESS:  Sanjuana Lawson is a 34 y.o. female who presents today for a botox injection.          Past Medical History  She has a past medical history of History of chickenpox, History of vaccination against human papillomavirus, Hypothyroidism, Narcolepsy, OAB (overactive bladder), Other conditions influencing health status, Pelvic floor dysfunction, Personal history of other infectious and parasitic diseases, S/P Botox injection (09/20/2018), VIVIAN (stress urinary incontinence, female), and Suspected sleep apnea.    Surgical History  She has a past surgical history that includes Bladder surgery and Cystoscopy (07/15/2024).     Social History  She reports that she has never smoked. She has never used smokeless tobacco. She reports current alcohol use of about 1.0 standard drink of alcohol per week. She reports that she does not use drugs.    Family History  Family History[1]     Allergies  Patient has no known allergies.      A comprehensive 10+ review of systems was negative except for: see hpi                          PHYSICAL EXAMINATION:  BP Readings from Last 3 Encounters:   05/04/25 120/80   02/26/25 110/66   08/26/24 125/65      Wt Readings from Last 3 Encounters:   05/04/25 72.6 kg (160 lb)   02/26/25 72.6 kg (160 lb 0.9 oz)   02/18/25 73.8 kg (162 lb 11.2 oz)      BMI:   Estimated body mass index is 23.97 kg/m² as calculated from the following:    Height as of 5/4/25: 1.74 m (5' 8.5\").    Weight as of 5/4/25: 72.6 kg (160 lb).  BSA:   Estimated body surface area is 1.87 meters squared as calculated from the following:    Height as of 5/4/25: 1.74 m (5' 8.5\").    Weight as of 5/4/25: 72.6 kg (160 lb).  HEENT: Normocephalic, atraumatic, PER EOMI, nonicteric, trachea normal, thyroid normal, oropharynx normal.  CARDIAC: regular rate & rhythm, S1 & S2 normal.  No heaves, thrills, gallops or murmurs.  LUNGS: Clear to auscultation, no spinal or CV tenderness.  EXTREMITIES: No evidence of cyanosis, " clubbing or edema.       Botox Injection    Sanjuana came today for Botox injection.  I reviewed with her the risks and benefits including ptosis, infection, and bleeding. She has no contraindications. She is on no antibiotics and has no neuromuscular disorders.  Pregnancy is not an issue.     She tolerated the procedure well.  The patient  will return to see me again in three to four months, earlier if there are any problems.     Sanjuana understands the side effects and risks, as well as the necessity for continued treatment to maintain improvement.  Additional therapy may be necessary. Call if there are any problems. See diagram for injection sites and dosages. 200 units were used at a concentration of 10 units per 0.1 mL.         Assessment:  Sanjuana Lawson is a 34 y.o. who presents with treatment refractory urinary urge incontinence     UUI:  -Has tried virtually every therapy including neuromodulation with sacral implant and PTNS, currently has the InterStim in place, Botox, all 7 medications,   -Cystoscopy normal   -Recent urodynamics demonstrates detrusor overactivity   -She does complain of some stress incontinence and this may indicate that she may have dysfunctional voiding   -Continue pelvic floor therapy with Leda   -Currently taking vaginal Valium to see if this improves her symptoms if it does we can consider pelvic floor Botox, I will tentatively schedule her for this in June   -Flomax worsened symptoms, discussed bladder sling but rather start with botox we will start with pelvic floor Botox   -We discussed potentially performing a bladder sling versus urethral bulking   -S/P pelvic floor botox, 200 units, 7/15/24, >90% improvement   -200 units bladder botox 11/14/24, used samples, significant improvement, done again 6/5/25  -S/P removal of IPG and bulkamid injection on 2/26/25, has some urgency, no VIVIAN, discussed that botox may limit durability of bulking  -Rx abx       VIVIAN:  -Improved markedly with  PT, PF Botox 7/15/2024  -S/p bulking 2/26/25       Adrenal Insufficiency:   -No new complaints  -following with endocrine       Follow up in 6 weeks       All questions and concerns were answered and addressed.  The patient expressed understanding and agrees with the plan.     Hernandez Woods MD    Scribe Attestation  By signing my name below, I, Janina Joel, [place]   attest that this documentation has been prepared under the direction and in the presence of Hernandez Woods MD.         [1]   Family History  Problem Relation Name Age of Onset    No Known Problems Mother      No Known Problems Father      Lung cancer Father's Brother          unspecified laterally, unspecified part of lung    Breast cancer Maternal Grandmother          unspecified site of breast, unspecified laterally    Other (cva) Maternal Grandfather          due to embolism of precebral artery    Breast cancer Paternal Grandfather          unspecified laterally, unspecified site of breast    Cancer Other multiple family members     Hypertension Other multiple family members

## 2025-06-05 ENCOUNTER — APPOINTMENT (OUTPATIENT)
Dept: UROLOGY | Facility: CLINIC | Age: 35
End: 2025-06-05
Payer: COMMERCIAL

## 2025-06-05 DIAGNOSIS — N32.81 OAB (OVERACTIVE BLADDER): ICD-10-CM

## 2025-06-05 LAB
POC APPEARANCE, URINE: CLEAR
POC BILIRUBIN, URINE: NEGATIVE
POC BLOOD, URINE: NEGATIVE
POC COLOR, URINE: YELLOW
POC GLUCOSE, URINE: NEGATIVE MG/DL
POC KETONES, URINE: ABNORMAL MG/DL
POC LEUKOCYTES, URINE: NEGATIVE
POC NITRITE,URINE: NEGATIVE
POC PH, URINE: 5.5 PH
POC PROTEIN, URINE: NEGATIVE MG/DL
POC SPECIFIC GRAVITY, URINE: 1.02
POC UROBILINOGEN, URINE: 0.2 EU/DL

## 2025-06-05 PROCEDURE — 52287 CYSTOSCOPY CHEMODENERVATION: CPT | Performed by: STUDENT IN AN ORGANIZED HEALTH CARE EDUCATION/TRAINING PROGRAM

## 2025-06-05 RX ORDER — LIDOCAINE HYDROCHLORIDE 10 MG/ML
50 INJECTION, SOLUTION INFILTRATION; PERINEURAL ONCE
Status: COMPLETED | OUTPATIENT
Start: 2025-06-05 | End: 2025-06-05

## 2025-06-05 RX ORDER — NITROFURANTOIN 25; 75 MG/1; MG/1
100 CAPSULE ORAL 2 TIMES DAILY
Qty: 14 CAPSULE | Refills: 0 | Status: SHIPPED | OUTPATIENT
Start: 2025-06-05 | End: 2025-06-12

## 2025-06-05 RX ORDER — INDOMETHACIN 25 MG/1
10 CAPSULE ORAL ONCE
Status: COMPLETED | OUTPATIENT
Start: 2025-06-05 | End: 2025-06-05

## 2025-06-05 RX ADMIN — INDOMETHACIN 10 MEQ: 25 CAPSULE ORAL at 09:42

## 2025-06-05 RX ADMIN — LIDOCAINE HYDROCHLORIDE 50 ML: 10 INJECTION, SOLUTION INFILTRATION; PERINEURAL at 09:42

## 2025-07-16 ENCOUNTER — OFFICE VISIT (OUTPATIENT)
Dept: URGENT CARE | Age: 35
End: 2025-07-16
Payer: COMMERCIAL

## 2025-07-16 VITALS
DIASTOLIC BLOOD PRESSURE: 72 MMHG | TEMPERATURE: 98.1 F | HEIGHT: 69 IN | SYSTOLIC BLOOD PRESSURE: 108 MMHG | HEART RATE: 70 BPM | WEIGHT: 155 LBS | RESPIRATION RATE: 20 BRPM | OXYGEN SATURATION: 96 % | BODY MASS INDEX: 22.96 KG/M2

## 2025-07-16 DIAGNOSIS — L23.7 POISON IVY DERMATITIS: Primary | ICD-10-CM

## 2025-07-16 PROCEDURE — 3008F BODY MASS INDEX DOCD: CPT | Performed by: PHYSICIAN ASSISTANT

## 2025-07-16 PROCEDURE — 99213 OFFICE O/P EST LOW 20 MIN: CPT | Performed by: PHYSICIAN ASSISTANT

## 2025-07-16 PROCEDURE — 1036F TOBACCO NON-USER: CPT | Performed by: PHYSICIAN ASSISTANT

## 2025-07-16 RX ORDER — METHYLPREDNISOLONE 4 MG/1
TABLET ORAL
Qty: 21 TABLET | Refills: 0 | Status: SHIPPED | OUTPATIENT
Start: 2025-07-16

## 2025-07-16 NOTE — PROGRESS NOTES
"Subjective   Patient ID: Sanjuana Lawson is a 34 y.o. female. They present today with a chief complaint of Poison Ivy (Poison ivy since 4 days).    History of Present Illness  Allergies: Reviewed.   Past medical history: Reviewed.   Past surgical history Reviewed.   Social history: Reviewed.    HPI: Patient is a very pleasant 34-year-old white female, no significant past medical history, presented to clinic with complaint of poison ivy.  Patient states she was working in the yard and garden about 5 days ago and states about 3 days ago she began to break out in a rash she thinks is poison ivy.  States she usually gets it every year.  She describes the rash as very itchy with streaks of blisters over her right leg and right arm and left arm.  Denies any drainage.  No further complaints.  Denies any pain.      Poison Ivy      Past Medical History  Allergies as of 07/16/2025    (No Known Allergies)       Prescriptions Prior to Admission[1]       Medical History[2]    Surgical History[3]     reports that she has never smoked. She has never used smokeless tobacco. She reports current alcohol use of about 1.0 standard drink of alcohol per week. She reports that she does not use drugs.    Review of Systems  Review of Systems                               Objective    Vitals:    07/16/25 1530   BP: 108/72   BP Location: Left arm   Patient Position: Sitting   Pulse: 70   Resp: 20   Temp: 36.7 °C (98.1 °F)   TempSrc: Oral   SpO2: 96%   Weight: 70.3 kg (155 lb)   Height: 1.74 m (5' 8.5\")     Patient's last menstrual period was 07/14/2025 (exact date).    Physical Exam    General: Vitals Noted. No distress. Normocephalic.     HEENT: TMs normal, EOMI, normal conjunctiva, patent nares, Normal OP    Neck: Supple with no adenopathy.     Cardiac: Regular Rate and Rhythm. No murmur.     Pulmonary: Equal breath sounds bilaterally. No wheezes, rhonchi, or rales.    Abdomen: Soft, non-tender, with normal bowel sounds.     Musculoskeletal: " Moves all extremities, no effusion, no edema.     Skin: There are clusters and streaks of vesicular lesions over the right anterior medial thigh, right forearm, and left forearm.  There are signs of excoriation however no breaks in skin.  There is no associated erythema induration or warmth.  No drainage.    Procedures    Point of Care Test & Imaging Results from this visit    Imaging  No results found.    Cardiology, Vascular, and Other Imaging  No other imaging results found for the past 2 days      Diagnostic study results (if any) were reviewed by Errol Acosta PA-C.    Assessment/Plan   Allergies, medications, history, and pertinent labs/EKGs/Imaging reviewed by Errol Acosta PA-C.     Medical Decision Making  Patient was seen eval in the clinic for complaint of poison ivy.  On exam patient is nontoxic well-appearing respite comfortably no acute distress.  Vital signs are stable, afebrile.  Chest clear, heart is regular, belly is diffusely soft and nontender.  Evaluation of skin as above.  Very consistent with poison ivy dermatitis.  Will provide a Medrol Dosepak.  Advise follow-up with her primary care physician in the next week.  Discharged home at this time.  Reviewed my impression, plan, strict return report ED precautions with the patient.  She expresses understanding and agreement plan of care.    Orders and Diagnoses  Diagnoses and all orders for this visit:  Poison ivy dermatitis  -     methylPREDNISolone (Medrol Dospak) 4 mg tablets; Take as directed on package.        Medical Admin Record      Follow Up Instructions  No follow-ups on file.    Patient disposition: Home    Electronically signed by Errol Acosta PA-C  3:42 PM         [1] (Not in a hospital admission)  [2]   Past Medical History:  Diagnosis Date    History of chickenpox     History of vaccination against human papillomavirus     Hypothyroidism     Narcolepsy     OAB (overactive bladder)     Other conditions  influencing health status     Uncomplicated asthma, unspecified asthma severity    Pelvic floor dysfunction     Personal history of other infectious and parasitic diseases     S/P Botox injection 09/20/2018    VIVIAN (stress urinary incontinence, female)     Suspected sleep apnea     not diagnosed officially   [3]   Past Surgical History:  Procedure Laterality Date    BLADDER SURGERY      complete interstem , battery replaced, botox injection    CYSTOSCOPY  07/15/2024    BOTOX

## 2025-07-17 ENCOUNTER — TELEPHONE (OUTPATIENT)
Dept: URGENT CARE | Age: 35
End: 2025-07-17

## 2025-07-21 NOTE — PROGRESS NOTES
Virtual or Telephone Consent    While technically available, the patient was unable or unwilling to consent to connect via audio/video telehealth technology; therefore, I performed this visit using a real-time audio only connection between Sanjuana Renny & Hernandez Woods MD.  Verbal consent was requested and obtained from Sanjuana Lawson on this date, 07/25/25 for a telehealth visit and the patient's location was confirmed at the time of the visit.    HISTORY OF PRESENT ILLNESS:  Sanjuana Lawson is a 34 y.o. female who presents today for a virtual follow up visit. She has had some improvement, but not as much as she would like. She would be interested in doing PF botox again.             Past Medical History  She has a past medical history of History of chickenpox, History of vaccination against human papillomavirus, Hypothyroidism, Narcolepsy, OAB (overactive bladder), Other conditions influencing health status, Pelvic floor dysfunction, Personal history of other infectious and parasitic diseases, S/P Botox injection (09/20/2018), VIVIAN (stress urinary incontinence, female), and Suspected sleep apnea.    Surgical History  She has a past surgical history that includes Bladder surgery and Cystoscopy (07/15/2024).     Social History  She reports that she has never smoked. She has never used smokeless tobacco. She reports current alcohol use of about 1.0 standard drink of alcohol per week. She reports that she does not use drugs.    Family History  Family History[1]     Allergies  Patient has no known allergies.      A comprehensive 10+ review of systems was negative except for: see hpi                  Assessment:  35 yo with treatment refractory urinary urge incontinence     UUI:  -Has tried virtually every therapy including neuromodulation with sacral implant and PTNS, currently has the InterStim in place, Botox, all 7 medications,   -Cystoscopy normal   -Recent urodynamics demonstrates detrusor overactivity   -She does  complain of some stress incontinence and this may indicate that she may have dysfunctional voiding   -Continue pelvic floor therapy with Leda   -Currently taking vaginal Valium to see if this improves her symptoms if it does we can consider pelvic floor Botox, I will tentatively schedule her for this in June   -Flomax worsened symptoms, discussed bladder sling but rather start with botox we will start with pelvic floor Botox   -We discussed potentially performing a bladder sling versus urethral bulking   -S/P pelvic floor botox, 200 units, 7/15/24, >90% improvement   -200 units bladder botox 11/14/24, used samples, significant improvement, done again 6/5/25  -S/P removal of IPG and bulkamid injection on 2/26/25, has some urgency, no VIVIAN, discussed that botox may limit durability of bulking  -Will do PF botox in September, she is okay doing this in office       VIVIAN:  -Improved markedly with PT, PF Botox 7/15/2024  -S/p bulking 2/26/25       Adrenal Insufficiency:   -No new complaints  -following with endocrine       Follow up in September        I spent a total of 20 minutes speaking with the patient on the telephone     All questions and concerns were answered and addressed.  The patient expressed understanding and agrees with the plan.     Hernandez Woods MD    Scribe Attestation  By signing my name below, I, Janina Maldonado, [place]   attest that this documentation has been prepared under the direction and in the presence of Hernandez Woods MD.         [1]   Family History  Problem Relation Name Age of Onset    No Known Problems Mother      No Known Problems Father      Lung cancer Father's Brother          unspecified laterally, unspecified part of lung    Breast cancer Maternal Grandmother          unspecified site of breast, unspecified laterally    Other (cva) Maternal Grandfather          due to embolism of precebral artery    Breast cancer Paternal Grandfather          unspecified laterally, unspecified site of  breast    Cancer Other multiple family members     Hypertension Other multiple family members

## 2025-07-24 ENCOUNTER — APPOINTMENT (OUTPATIENT)
Dept: UROLOGY | Facility: CLINIC | Age: 35
End: 2025-07-24
Payer: COMMERCIAL

## 2025-07-24 DIAGNOSIS — N39.3 SUI (STRESS URINARY INCONTINENCE, FEMALE): Primary | ICD-10-CM

## 2025-07-24 DIAGNOSIS — N32.81 OAB (OVERACTIVE BLADDER): ICD-10-CM

## 2025-07-24 PROCEDURE — 99213 OFFICE O/P EST LOW 20 MIN: CPT | Performed by: STUDENT IN AN ORGANIZED HEALTH CARE EDUCATION/TRAINING PROGRAM

## 2025-08-13 ENCOUNTER — OFFICE VISIT (OUTPATIENT)
Dept: URGENT CARE | Age: 35
End: 2025-08-13
Payer: COMMERCIAL

## 2025-08-13 VITALS
TEMPERATURE: 98 F | BODY MASS INDEX: 23.49 KG/M2 | DIASTOLIC BLOOD PRESSURE: 67 MMHG | WEIGHT: 155 LBS | RESPIRATION RATE: 16 BRPM | HEART RATE: 62 BPM | HEIGHT: 68 IN | SYSTOLIC BLOOD PRESSURE: 108 MMHG | OXYGEN SATURATION: 100 %

## 2025-08-13 DIAGNOSIS — O23.41 URINARY TRACT INFECTION IN MOTHER DURING FIRST TRIMESTER OF PREGNANCY (HHS-HCC): Primary | ICD-10-CM

## 2025-08-13 DIAGNOSIS — R82.998 URINE LEUKOCYTES: ICD-10-CM

## 2025-08-13 LAB
POC APPEARANCE, URINE: ABNORMAL
POC BILIRUBIN, URINE: NEGATIVE
POC BLOOD, URINE: NEGATIVE
POC COLOR, URINE: YELLOW
POC GLUCOSE, URINE: NEGATIVE MG/DL
POC KETONES, URINE: NEGATIVE MG/DL
POC LEUKOCYTES, URINE: ABNORMAL
POC NITRITE,URINE: NEGATIVE
POC PH, URINE: 6 PH
POC PROTEIN, URINE: NEGATIVE MG/DL
POC SPECIFIC GRAVITY, URINE: 1.01
POC UROBILINOGEN, URINE: 0.2 EU/DL
PREGNANCY TEST URINE, POC: POSITIVE

## 2025-08-13 PROCEDURE — 99214 OFFICE O/P EST MOD 30 MIN: CPT

## 2025-08-13 PROCEDURE — 3008F BODY MASS INDEX DOCD: CPT

## 2025-08-13 PROCEDURE — 81003 URINALYSIS AUTO W/O SCOPE: CPT

## 2025-08-13 PROCEDURE — 99070 SPECIAL SUPPLIES PHYS/QHP: CPT

## 2025-08-13 PROCEDURE — 81025 URINE PREGNANCY TEST: CPT

## 2025-08-13 RX ORDER — CEPHALEXIN 500 MG/1
500 CAPSULE ORAL 2 TIMES DAILY
Qty: 14 CAPSULE | Refills: 0 | Status: SHIPPED | OUTPATIENT
Start: 2025-08-13 | End: 2025-08-20

## 2025-08-15 LAB — BACTERIA UR CULT: ABNORMAL

## 2025-09-25 ENCOUNTER — APPOINTMENT (OUTPATIENT)
Dept: UROLOGY | Facility: CLINIC | Age: 35
End: 2025-09-25
Payer: COMMERCIAL

## (undated) DEVICE — NEEDLE, SAFETY, 22 G X 1.5 IN

## (undated) DEVICE — Device

## (undated) DEVICE — BAG, DECANTER

## (undated) DEVICE — TUBING, SUCTION, CONNECTING, NON-CONDUCTIVE, SURE GRIP CONNECTORS, 3/16 IN X 10 FT

## (undated) DEVICE — DRESSING, TRANSPARENT, TEGADERM, 4 X 4-3/4 IN

## (undated) DEVICE — APPLICATOR, CHLORAPREP, W/ORANGE TINT, 26ML

## (undated) DEVICE — SYRINGE, 10 CC, LUER LOCK

## (undated) DEVICE — SOLUTION, INJECTION, SODIUM CHLORIDE, 0.9%

## (undated) DEVICE — CAUTERY, PENCIL, PUSH BUTTON, SMOKE EVAC, 70MM

## (undated) DEVICE — PREP TRAY, SKIN, DRY, W/GLOVES

## (undated) DEVICE — DRAPE, SHEET, 17 X 23 IN

## (undated) DEVICE — DRESSING, GAUZE, SPONGE, 12 PLY, CURITY, 4 X 4 IN, STERILE

## (undated) DEVICE — DRAPE PACK, MINOR, CUSTOM, GEAUGA

## (undated) DEVICE — NEEDLE, CYSTOSCOPE, BONEE, 22G X 35CM, 4MM TIP, STERILE EO

## (undated) DEVICE — SYRINGE, HYPODERMIC, CONTROL, LUER LOCK, 10 CC, PLASTIC, STERILE

## (undated) DEVICE — SUTURE, VICRYL, 2-0, 27 IN, SH, UNDYED

## (undated) DEVICE — IRRIGATION SET, CYSTOSCOPY, TURP, Y, CONTINUOUS, 81 IN

## (undated) DEVICE — STOPCOCK, SAN ANTONIO, W/MODIFIED FITTING

## (undated) DEVICE — NEEDLE, SPINAL, 20 G X 3.5 IN, YELLOW HUB

## (undated) DEVICE — COLLECTION BAG, FLUID, NON-STERILE

## (undated) DEVICE — DRAPE, SHEET, LAPAROTOMY, W/ISO-BAC, W/ARMBOARD COVERS, 98 X 122 IN, DISPOSABLE, LF, STERILE

## (undated) DEVICE — ADHESIVE, SKIN, DERMABOND ADVANCED, 15CM, PEN-STYLE

## (undated) DEVICE — SUTURE, MONOCRYL, 4-0, 18 IN, PS2, UNDYED